# Patient Record
Sex: FEMALE | Race: AMERICAN INDIAN OR ALASKA NATIVE | NOT HISPANIC OR LATINO | Employment: OTHER | URBAN - METROPOLITAN AREA
[De-identification: names, ages, dates, MRNs, and addresses within clinical notes are randomized per-mention and may not be internally consistent; named-entity substitution may affect disease eponyms.]

---

## 2021-10-25 ENCOUNTER — HOSPITAL ENCOUNTER (EMERGENCY)
Facility: MEDICAL CENTER | Age: 31
End: 2021-10-25
Attending: EMERGENCY MEDICINE
Payer: COMMERCIAL

## 2021-10-25 VITALS
WEIGHT: 278 LBS | OXYGEN SATURATION: 95 % | HEART RATE: 101 BPM | HEIGHT: 68 IN | RESPIRATION RATE: 20 BRPM | BODY MASS INDEX: 42.13 KG/M2 | DIASTOLIC BLOOD PRESSURE: 68 MMHG | TEMPERATURE: 97.5 F | SYSTOLIC BLOOD PRESSURE: 121 MMHG

## 2021-10-25 DIAGNOSIS — F15.10 METHAMPHETAMINE ABUSE (HCC): ICD-10-CM

## 2021-10-25 DIAGNOSIS — F23 ACUTE PSYCHOSIS (HCC): ICD-10-CM

## 2021-10-25 LAB
ALBUMIN SERPL BCP-MCNC: 4.5 G/DL (ref 3.2–4.9)
ALBUMIN/GLOB SERPL: 1.2 G/DL
ALP SERPL-CCNC: 122 U/L (ref 30–99)
ALT SERPL-CCNC: 36 U/L (ref 2–50)
AMPHET UR QL SCN: POSITIVE
ANION GAP SERPL CALC-SCNC: 20 MMOL/L (ref 7–16)
ANISOCYTOSIS BLD QL SMEAR: ABNORMAL
AST SERPL-CCNC: 35 U/L (ref 12–45)
BARBITURATES UR QL SCN: NEGATIVE
BASOPHILS # BLD AUTO: 0.9 % (ref 0–1.8)
BASOPHILS # BLD: 0.13 K/UL (ref 0–0.12)
BENZODIAZ UR QL SCN: NEGATIVE
BILIRUB SERPL-MCNC: 0.2 MG/DL (ref 0.1–1.5)
BUN SERPL-MCNC: 7 MG/DL (ref 8–22)
BZE UR QL SCN: NEGATIVE
CALCIUM SERPL-MCNC: 9.6 MG/DL (ref 8.5–10.5)
CANNABINOIDS UR QL SCN: POSITIVE
CHLORIDE SERPL-SCNC: 96 MMOL/L (ref 96–112)
CO2 SERPL-SCNC: 19 MMOL/L (ref 20–33)
COMMENT 1642: NORMAL
CREAT SERPL-MCNC: 0.75 MG/DL (ref 0.5–1.4)
EOSINOPHIL # BLD AUTO: 0.07 K/UL (ref 0–0.51)
EOSINOPHIL NFR BLD: 0.5 % (ref 0–6.9)
ERYTHROCYTE [DISTWIDTH] IN BLOOD BY AUTOMATED COUNT: 42.3 FL (ref 35.9–50)
ETHANOL BLD-MCNC: <10.1 MG/DL (ref 0–10)
GLOBULIN SER CALC-MCNC: 3.9 G/DL (ref 1.9–3.5)
GLUCOSE SERPL-MCNC: 102 MG/DL (ref 65–99)
HCG SERPL QL: NEGATIVE
HCT VFR BLD AUTO: 40.5 % (ref 37–47)
HGB BLD-MCNC: 13.2 G/DL (ref 12–16)
IMM GRANULOCYTES # BLD AUTO: 0.52 K/UL (ref 0–0.11)
IMM GRANULOCYTES NFR BLD AUTO: 3.7 % (ref 0–0.9)
LYMPHOCYTES # BLD AUTO: 2.47 K/UL (ref 1–4.8)
LYMPHOCYTES NFR BLD: 17.4 % (ref 22–41)
MACROCYTES BLD QL SMEAR: ABNORMAL
MCH RBC QN AUTO: 30.3 PG (ref 27–33)
MCHC RBC AUTO-ENTMCNC: 32.6 G/DL (ref 33.6–35)
MCV RBC AUTO: 92.9 FL (ref 81.4–97.8)
METHADONE UR QL SCN: NEGATIVE
MICROCYTES BLD QL SMEAR: ABNORMAL
MONOCYTES # BLD AUTO: 1.18 K/UL (ref 0–0.85)
MONOCYTES NFR BLD AUTO: 8.3 % (ref 0–13.4)
MORPHOLOGY BLD-IMP: NORMAL
NEUTROPHILS # BLD AUTO: 9.86 K/UL (ref 2–7.15)
NEUTROPHILS NFR BLD: 69.2 % (ref 44–72)
NRBC # BLD AUTO: 0.07 K/UL
NRBC BLD-RTO: 0.5 /100 WBC
OPIATES UR QL SCN: NEGATIVE
OXYCODONE UR QL SCN: NEGATIVE
PCP UR QL SCN: NEGATIVE
PLATELET # BLD AUTO: 317 K/UL (ref 164–446)
PLATELET BLD QL SMEAR: NORMAL
PMV BLD AUTO: 10 FL (ref 9–12.9)
POIKILOCYTOSIS BLD QL SMEAR: NORMAL
POLYCHROMASIA BLD QL SMEAR: NORMAL
POTASSIUM SERPL-SCNC: 4.1 MMOL/L (ref 3.6–5.5)
PROPOXYPH UR QL SCN: NEGATIVE
PROT SERPL-MCNC: 8.4 G/DL (ref 6–8.2)
RBC # BLD AUTO: 4.36 M/UL (ref 4.2–5.4)
RBC BLD AUTO: PRESENT
SODIUM SERPL-SCNC: 135 MMOL/L (ref 135–145)
WBC # BLD AUTO: 14.2 K/UL (ref 4.8–10.8)

## 2021-10-25 PROCEDURE — 96376 TX/PRO/DX INJ SAME DRUG ADON: CPT

## 2021-10-25 PROCEDURE — 700111 HCHG RX REV CODE 636 W/ 250 OVERRIDE (IP): Performed by: EMERGENCY MEDICINE

## 2021-10-25 PROCEDURE — 99285 EMERGENCY DEPT VISIT HI MDM: CPT

## 2021-10-25 PROCEDURE — A9270 NON-COVERED ITEM OR SERVICE: HCPCS | Performed by: EMERGENCY MEDICINE

## 2021-10-25 PROCEDURE — 80307 DRUG TEST PRSMV CHEM ANLYZR: CPT

## 2021-10-25 PROCEDURE — 82077 ASSAY SPEC XCP UR&BREATH IA: CPT

## 2021-10-25 PROCEDURE — 80053 COMPREHEN METABOLIC PANEL: CPT

## 2021-10-25 PROCEDURE — 700102 HCHG RX REV CODE 250 W/ 637 OVERRIDE(OP): Performed by: EMERGENCY MEDICINE

## 2021-10-25 PROCEDURE — 85025 COMPLETE CBC W/AUTO DIFF WBC: CPT

## 2021-10-25 PROCEDURE — 84703 CHORIONIC GONADOTROPIN ASSAY: CPT

## 2021-10-25 PROCEDURE — 96374 THER/PROPH/DIAG INJ IV PUSH: CPT

## 2021-10-25 RX ORDER — LORAZEPAM 2 MG/ML
2 INJECTION INTRAMUSCULAR ONCE
Status: COMPLETED | OUTPATIENT
Start: 2021-10-25 | End: 2021-10-25

## 2021-10-25 RX ORDER — LORAZEPAM 2 MG/ML
1 INJECTION INTRAMUSCULAR ONCE
Status: COMPLETED | OUTPATIENT
Start: 2021-10-25 | End: 2021-10-25

## 2021-10-25 RX ORDER — LORAZEPAM 2 MG/ML
2 INJECTION INTRAMUSCULAR ONCE
Status: DISCONTINUED | OUTPATIENT
Start: 2021-10-25 | End: 2021-10-25

## 2021-10-25 RX ADMIN — LORAZEPAM 1 MG: 2 INJECTION INTRAMUSCULAR; INTRAVENOUS at 01:58

## 2021-10-25 RX ADMIN — NICOTINE POLACRILEX 2 MG: 2 GUM, CHEWING BUCCAL at 12:03

## 2021-10-25 RX ADMIN — LORAZEPAM 2 MG: 2 INJECTION INTRAMUSCULAR; INTRAVENOUS at 11:14

## 2021-10-25 RX ADMIN — NICOTINE POLACRILEX 2 MG: 2 GUM, CHEWING BUCCAL at 05:21

## 2021-10-25 NOTE — DISCHARGE PLANNING
Alert Team  Consult order noted.  Pt added to list of pending consults; she is #5, so will likely be a few hours.

## 2021-10-25 NOTE — ED NOTES
Pt increasingly agitated, has  Come out of room when saw [employer] residents in [place] discussing another pt. Pt demanding home medications but this can not be verified through family or pharmacy. ERP orders received.

## 2021-10-25 NOTE — ED NOTES
Med Rec unable to be obtained: Pt answers interview questions inappropriately, unable to specify if she is currently using medications.     Attempted call to demographics, with no answer. No home pharmacy listed.

## 2021-10-25 NOTE — ED TRIAGE NOTES
"Chief Complaint   Patient presents with   • Psych Eval     Pt brought in by PD for safety concern. PD was called by gas station staff for pt sitting out in the rain. Upon arrival pt is speaking rapidly with tangential speech. Denies SI/HI. No medical complaints at this time.      .BP (!) 164/79   Pulse (!) 137   Temp 36.2 °C (97.1 °F) (Temporal)   Resp 16   Ht 1.727 m (5' 8\")   Wt (!) 126 kg (278 lb)   SpO2 92%   BMI 42.27 kg/m²     "

## 2021-10-25 NOTE — ED NOTES
Pt unable to participate in interview,   Called pt's family, no answer.  Pt thought she filled RX's at [facility] but PharmD was unable to find profile for pt.

## 2021-10-25 NOTE — ED NOTES
Requesting to leave and be discharged. Pt also wants to go outside to smoke. Updated to poc. Pt has nicotine gum. Pt sitting up at eob.

## 2021-10-25 NOTE — ED NOTES
frequently talking with people passing room. Pt thinking they are talking about her. Pt reassured and redirected to room

## 2021-10-25 NOTE — ED NOTES
"Pt came out to nurses station insisting she needs to go outside and smoke a cigarette and then leave. Pt educated on no smoking policy and re-educated that she is on a legal hod at this time. Pt visibly upset and arguing with RN insisting that she is not on a legal hold stating  And making paranoid statements, saying \"No I'm an adult, this is kidnapping, you guys are going to send me to [facility] and they're gonna murder me there!\" RN offered pt nicotine gum as alternative, pt accepted. When RN left the room to retrieve gum for pt and returned a few mutes later, pt's room smelled heavily of cigarette smoke. RN asked pt if she had smoked a cigarette pt stated Yeah you weren't gonna let me.\"  Pt told she may not smoke in the room and if she did so again her cigarettes would be taken away. Pt verbalized understanding, given nicotine now laying in bed, NADN.   "

## 2021-10-25 NOTE — ED PROVIDER NOTES
"ED Provider Note    CHIEF COMPLAINT  Chief Complaint   Patient presents with   • Psych Eval     Pt brought in by PD for safety concern. PD was called by gas station staff for pt sitting out in the rain. Upon arrival pt is speaking rapidly with tangential speech. Denies SI/HI. No medical complaints at this time.        HPI  Sandra Sigala is a 31 y.o. female who presents to the emergency department with altered mental status. Past medical history unknown given patient clinical condition. Law-enforcement call to patient which was sitting in the rain at local gas station. Patient listed methamphetamine use.    REVIEW OF SYSTEMS  See HPI for further details. All other systems are negative.     PAST MEDICAL HISTORY       SOCIAL HISTORY  Social History     Tobacco Use   • Smoking status: Not on file   Substance and Sexual Activity   • Alcohol use: Not on file   • Drug use: Not on file   • Sexual activity: Not on file       SURGICAL HISTORY  patient denies any surgical history    CURRENT MEDICATIONS  Home Medications    **Home medications have not yet been reviewed for this encounter**         ALLERGIES  Allergies   Allergen Reactions   • Dilaudid [Hydromorphone] Swelling       PHYSICAL EXAM  VITAL SIGNS: BP (!) 164/79   Pulse (!) 137   Temp 36.2 °C (97.1 °F) (Temporal)   Resp 16   Ht 1.727 m (5' 8\")   Wt (!) 126 kg (278 lb)   SpO2 92%   BMI 42.27 kg/m²  @DAPHNIE[[id number]::@  Pulse ox interpretation: I interpret this pulse ox as normal.  Constitutional: Alert in no apparent distress.  HENT: Normocephalic, Atraumatic, Bilateral external ears normal. Nose normal.   Eyes: Pupils are equal and reactive.   Heart: Regular rate and rythm, no murmurs.    Lungs: Clear to auscultation bilaterally.  Skin: Warm, Dry, No erythema, No rash.   Neurologic: Alert, Grossly non-focal.   Psychiatric:  pressured speech, tangential, no SI HI    Results for orders placed or performed during the hospital encounter of 10/25/21   CBC WITH " DIFFERENTIAL   Result Value Ref Range    WBC 14.2 (H) 4.8 - 10.8 K/uL    RBC 4.36 4.20 - 5.40 M/uL    Hemoglobin 13.2 12.0 - 16.0 g/dL    Hematocrit 40.5 37.0 - 47.0 %    MCV 92.9 81.4 - 97.8 fL    MCH 30.3 27.0 - 33.0 pg    MCHC 32.6 (L) 33.6 - 35.0 g/dL    RDW 42.3 35.9 - 50.0 fL    Platelet Count 317 164 - 446 K/uL    MPV 10.0 9.0 - 12.9 fL    Nucleated RBC 0.50 /100 WBC    NRBC (Absolute) 0.07 K/uL   HCG Qual Serum   Result Value Ref Range    Beta-Hcg Qualitative Serum Negative Negative   COMP METABOLIC PANEL   Result Value Ref Range    Sodium 135 135 - 145 mmol/L    Potassium 4.1 3.6 - 5.5 mmol/L    Chloride 96 96 - 112 mmol/L    Co2 19 (L) 20 - 33 mmol/L    Anion Gap 20.0 (H) 7.0 - 16.0    Glucose 102 (H) 65 - 99 mg/dL    Bun 7 (L) 8 - 22 mg/dL    Creatinine 0.75 0.50 - 1.40 mg/dL    Calcium 9.6 8.5 - 10.5 mg/dL    AST(SGOT) 35 12 - 45 U/L    ALT(SGPT) 36 2 - 50 U/L    Alkaline Phosphatase 122 (H) 30 - 99 U/L    Total Bilirubin 0.2 0.1 - 1.5 mg/dL    Albumin 4.5 3.2 - 4.9 g/dL    Total Protein 8.4 (H) 6.0 - 8.2 g/dL    Globulin 3.9 (H) 1.9 - 3.5 g/dL    A-G Ratio 1.2 g/dL   DIAGNOSTIC ALCOHOL   Result Value Ref Range    Diagnostic Alcohol <10.1 0.0 - 10.0 mg/dL   URINE DRUG SCREEN (TRIAGE)   Result Value Ref Range    Amphetamines Urine Positive (A) Negative    Barbiturates Negative Negative    Benzodiazepines Negative Negative    Cocaine Metabolite Negative Negative    Methadone Negative Negative    Opiates Negative Negative    Oxycodone Negative Negative    Phencyclidine -Pcp Negative Negative    Propoxyphene Negative Negative    Cannabinoid Metab Positive (A) Negative   ESTIMATED GFR   Result Value Ref Range    GFR If African American >60 >60 mL/min/1.73 m 2    GFR If Non African American >60 >60 mL/min/1.73 m 2       Emergency department observation:  patient requiring ongoing ER observation for further detoxification from polysubstance abuse and acute psychosis. Will need additional psychiatric evaluation  as well is not currently available until daytime.    start time 0300  family history: patient unable to provide secondary to clinical condition    COURSE & MEDICAL DECISION MAKING  Pertinent Labs & Imaging studies reviewed. (See chart for details)  31-year-old female presented to the emergency department with acute altered middle status and psychosis. Likely driven by methamphetamine as she does admit to. Denies any suicidal or homicidal ideations. Currently unable to care for self and has been placed on legal hold by law enforcement. This has been upheld by myself but patient can be reevaluated in the morning once more sober and once behavioral health services are available for further evaluation.        FINAL IMPRESSION  1. Acute psychosis (HCC)    2. Methamphetamine abuse (HCC)               Electronically signed by: Sandeep Gloria M.D., 10/25/2021 2:50 AM

## 2021-10-25 NOTE — ED NOTES
"PIV placed. Pt tolerated placement well. Blood drawn, labelled with appropriate pt identifiers and sent to lab.   Pt medicated per MAR.  Pt making delusional statmets to RN, \"I have 28 kids, 1 one and 27 girls all from my first pregnancy. That was in my first life, I don't have any kids in this life.\" Pt sitting in bed smiling and watching tv, FORREST LOPEZ.   "

## 2021-10-25 NOTE — PROGRESS NOTES
"ED Provider Progress Note    ED Observation Progress Note    Date of Service: 10/25/21    Interval History  Patient is on a legal hold (page 1) today, medically cleared, but thought to have altered mental status, acute psychosis secondary to methamphetamine use.  Awaiting alert team evaluation, hopeful washout before final disposition.  Please refer to initial notes for complete details.  Patient required Ativan earlier this morning, but is resting comfortably now.  We will try to confirm home medications, however patient states that methamphetamine is prescribed by her doctor for her \"brain cancer and bone cancer.\"    0515 -patient requesting Nicorette gum, states she is allergic to the patch.    1000 -patient reevaluated at bedside.  Still disoriented, tangential but redirectable and cooperative.  Awaiting alert team assessment.    1105 -patient somewhat more agitated, difficulty redirecting although she remains in her exam room.  Will repeat Ativan.    Physical Exam  /77   Pulse (!) 104   Temp 36.3 °C (97.4 °F)   Resp 18   Ht 1.727 m (5' 8\")   Wt (!) 126 kg (278 lb)   SpO2 96%   BMI 42.27 kg/m² .    Constitutional: Awake and alert. Nontoxic  HENT:  Grossly normal  Eyes: Grossly normal  Neck: Normal range of motion  Cardiovascular: Normal heart rate   Thorax & Lungs: No respiratory distress  Abdomen: Nontender  Skin:  No pathologic rash.   Extremities: Well perfused  Psychiatric: Affect normal    Labs  Results for orders placed or performed during the hospital encounter of 10/25/21   CBC WITH DIFFERENTIAL   Result Value Ref Range    WBC 14.2 (H) 4.8 - 10.8 K/uL    RBC 4.36 4.20 - 5.40 M/uL    Hemoglobin 13.2 12.0 - 16.0 g/dL    Hematocrit 40.5 37.0 - 47.0 %    MCV 92.9 81.4 - 97.8 fL    MCH 30.3 27.0 - 33.0 pg    MCHC 32.6 (L) 33.6 - 35.0 g/dL    RDW 42.3 35.9 - 50.0 fL    Platelet Count 317 164 - 446 K/uL    MPV 10.0 9.0 - 12.9 fL    Neutrophils-Polys 69.20 44.00 - 72.00 %    Lymphocytes 17.40 (L) " 22.00 - 41.00 %    Monocytes 8.30 0.00 - 13.40 %    Eosinophils 0.50 0.00 - 6.90 %    Basophils 0.90 0.00 - 1.80 %    Immature Granulocytes 3.70 (H) 0.00 - 0.90 %    Nucleated RBC 0.50 /100 WBC    Neutrophils (Absolute) 9.86 (H) 2.00 - 7.15 K/uL    Lymphs (Absolute) 2.47 1.00 - 4.80 K/uL    Monos (Absolute) 1.18 (H) 0.00 - 0.85 K/uL    Eos (Absolute) 0.07 0.00 - 0.51 K/uL    Baso (Absolute) 0.13 (H) 0.00 - 0.12 K/uL    Immature Granulocytes (abs) 0.52 (H) 0.00 - 0.11 K/uL    NRBC (Absolute) 0.07 K/uL    Anisocytosis 1+     Macrocytosis 1+     Microcytosis 1+    HCG Qual Serum   Result Value Ref Range    Beta-Hcg Qualitative Serum Negative Negative   COMP METABOLIC PANEL   Result Value Ref Range    Sodium 135 135 - 145 mmol/L    Potassium 4.1 3.6 - 5.5 mmol/L    Chloride 96 96 - 112 mmol/L    Co2 19 (L) 20 - 33 mmol/L    Anion Gap 20.0 (H) 7.0 - 16.0    Glucose 102 (H) 65 - 99 mg/dL    Bun 7 (L) 8 - 22 mg/dL    Creatinine 0.75 0.50 - 1.40 mg/dL    Calcium 9.6 8.5 - 10.5 mg/dL    AST(SGOT) 35 12 - 45 U/L    ALT(SGPT) 36 2 - 50 U/L    Alkaline Phosphatase 122 (H) 30 - 99 U/L    Total Bilirubin 0.2 0.1 - 1.5 mg/dL    Albumin 4.5 3.2 - 4.9 g/dL    Total Protein 8.4 (H) 6.0 - 8.2 g/dL    Globulin 3.9 (H) 1.9 - 3.5 g/dL    A-G Ratio 1.2 g/dL   DIAGNOSTIC ALCOHOL   Result Value Ref Range    Diagnostic Alcohol <10.1 0.0 - 10.0 mg/dL   URINE DRUG SCREEN (TRIAGE)   Result Value Ref Range    Amphetamines Urine Positive (A) Negative    Barbiturates Negative Negative    Benzodiazepines Negative Negative    Cocaine Metabolite Negative Negative    Methadone Negative Negative    Opiates Negative Negative    Oxycodone Negative Negative    Phencyclidine -Pcp Negative Negative    Propoxyphene Negative Negative    Cannabinoid Metab Positive (A) Negative   ESTIMATED GFR   Result Value Ref Range    GFR If African American >60 >60 mL/min/1.73 m 2    GFR If Non African American >60 >60 mL/min/1.73 m 2   PERIPHERAL SMEAR REVIEW   Result  Value Ref Range    Peripheral Smear Review see below    PLATELET ESTIMATE   Result Value Ref Range    Plt Estimation Normal    MORPHOLOGY   Result Value Ref Range    RBC Morphology Present     Polychromia 1+     Poikilocytosis 1+    DIFFERENTIAL COMMENT   Result Value Ref Range    Comments-Diff see below        Radiology  No orders to display       Problem List  1.  Acute psychosis: Awaiting alert team assessment, will likely require further psychiatric intervention before final disposition can be made.      Electronically signed by: Gracia Ronquillo D.O., 10/25/2021 11:06 AM

## 2021-10-25 NOTE — ED NOTES
Pt ambulated to bathroom steady gait. Pt has all belongings and clothing in place. Pt is currently on a legal hold. Updated to poc and need for belongings to be placed in bag and secure area. Pt agrees. Changed to gown and bags x 2 placed in locker.

## 2021-10-25 NOTE — ED NOTES
explained poc and unable to verify home medication. Pt medicated as ordered. Pt reports she was recently inpatient at [facility] in [facility].

## 2021-10-26 ENCOUNTER — HOSPITAL ENCOUNTER (EMERGENCY)
Facility: MEDICAL CENTER | Age: 31
End: 2021-10-26
Attending: EMERGENCY MEDICINE | Admitting: EMERGENCY MEDICINE
Payer: COMMERCIAL

## 2021-10-26 VITALS
WEIGHT: 278 LBS | TEMPERATURE: 98.7 F | HEART RATE: 105 BPM | RESPIRATION RATE: 20 BRPM | OXYGEN SATURATION: 93 % | SYSTOLIC BLOOD PRESSURE: 116 MMHG | DIASTOLIC BLOOD PRESSURE: 78 MMHG | HEIGHT: 68 IN | BODY MASS INDEX: 42.13 KG/M2

## 2021-10-26 DIAGNOSIS — F15.10 METHAMPHETAMINE ABUSE (HCC): ICD-10-CM

## 2021-10-26 PROCEDURE — 99283 EMERGENCY DEPT VISIT LOW MDM: CPT

## 2021-10-26 ASSESSMENT — FIBROSIS 4 INDEX: FIB4 SCORE: 0.57

## 2021-10-26 NOTE — ED NOTES
Late entry 1446  Behavioral health RN at bedside.     1500 Pt remains on close observation. Ambulatory to bathroom     1717 clothing returned to pt. Pt  Discharged home to self. Understands to follow up as scheduled later this week. Pt remained under q 15 observation during epic downtime.

## 2021-10-26 NOTE — ED PROVIDER NOTES
"ED Provider Note    CHIEF COMPLAINT  Chief Complaint   Patient presents with   • Drug Abuse   • Psych Eval       HPI  Sandra Sigala is a 31 y.o. female who presents back to the emergency department stating that she was tresp[place]. States that she does not have money. Has no place to go.    Chart review: I personally saw the patient last night. At that point she was clearly intoxicated on methamphetamine. Was seen by psychiatry and discharged from the hospital and taken off legal hold as was originally placed by law enforcement last night.    Currently no SI HI    REVIEW OF SYSTEMS  See HPI for further details. All other systems are negative.     PAST MEDICAL HISTORY       SOCIAL HISTORY  Social History     Tobacco Use   • Smoking status: Never Smoker   • Smokeless tobacco: Never Used   Vaping Use   • Vaping Use: Never used   Substance and Sexual Activity   • Alcohol use: Yes   • Drug use: Yes     Comment: meth   • Sexual activity: Not on file       SURGICAL HISTORY  patient denies any surgical history    CURRENT MEDICATIONS  Home Medications    **Home medications have not yet been reviewed for this encounter**         ALLERGIES  Allergies   Allergen Reactions   • Dilaudid [Hydromorphone] Swelling       PHYSICAL EXAM  VITAL SIGNS: /81   Pulse (!) 112   Temp 36.2 °C (97.1 °F) (Temporal)   Resp 20   Ht 1.727 m (5' 8\")   Wt (!) 126 kg (278 lb)   LMP  (LMP Unknown)   SpO2 91%   BMI 42.27 kg/m²  @DAPHNIE[[id number]::@  Pulse ox interpretation: I interpret this pulse ox as normal.  Constitutional: Alert in no apparent distress.  HENT: Normocephalic, Atraumatic, Bilateral external ears normal. Nose normal.   Eyes: Pupils are equal and reactive.  Heart: Regular rate and rythm, no murmurs.    Lungs: Clear to auscultation bilaterally.  Skin: Warm, Dry, No erythema, No rash.   Neurologic: Alert, Grossly non-focal.   Psychiatric: slightly pressured speech with slight agitation, no SI " HI          COURSE & MEDICAL DECISION MAKING  Pertinent Labs & Imaging studies reviewed. (See chart for details)  31-year-old female presented to the emergency department with primary complaint of no place ago with no money. Denies any suicidal homicidal ideations. Less intoxicated tonight. No threat to self or others. Able to take care of self per my evaluation. I do not believe that she meets legal hold criteria. You not believe that she needs further acute ER evaluation from a behavioral health standpoint. This point the patient will be transfer to local [facility] for ongoing outpatient polysubstance abuse counseling as the patient agrees to currently.      . The patient will return for worsening symptoms and is stable at the time of discharge. The patient verbalizes understanding and will comply.    FINAL IMPRESSION  1. Methamphetamine abuse (HCC)               Electronically signed by: Sandeep Gloria M.D., 10/26/2021 3:49 AM

## 2021-10-26 NOTE — ED NOTES
Patient discharged in stable condition per orders. Patient verbalized understanding of all discharge instructions. All belongings accounted for. Ambulatory to lobby with steady gait. Cab called for pt, pt to go to [facility].

## 2021-10-26 NOTE — DISCHARGE PLANNING
Alert Team:    ERP spoke with patient at bedside who is amendable to connect with [facility] for crisis stabilization to address substance detox and PMH treatment. Cab voucher # [id number] provided at discharge. Secure e-mail ER interventions to facility.

## 2021-10-26 NOTE — ED TRIAGE NOTES
Sandra Sigala  31 y.o.  Chief Complaint   Patient presents with   • Drug Abuse   • Psych Eval       BIB EMS for above complaint. Ambulatory to room from ambulance, pt reports unable to ambulate due to paralysis. Pt speaks in tangents with unformed thoughts, unable to follow commands, A&Ox4. Per UDS yesterday, pt positive for meth, denies use at this time. FSBS 94.

## 2021-10-26 NOTE — DISCHARGE SUMMARY
"  ED Observation Discharge Summary    Patient:Sandra Sigala  Patient : 1990  Patient MRN: [id number]  Patient PCP: No primary care provider on file.    Admit Date: 10/25/2021  Discharge Date and Time: 10/25/21 5:42 PM  Discharge Diagnosis:   1. Acute psychosis (HCC)    2. Methamphetamine abuse (HCC)      Discharge Attending: Celestino Chisholm M.D.  Discharge Service: ED Observation    ED Course  Sandra is a 31 y.o. female who was evaluated at [facility] for acute psychosis secondary to methamphetamine abuse.  She was held in the emergency department on a legal  hold with an inability to reliably care for herself.  During her time here her mentation improved significantly, she was evaluated by the psychiatric evaluator and is now clearly demonstrating the ability to care for herself that she seems to have cleared significantly from her methamphetamine induced acute psychosis.  The patient is being released from her legal hold and being discharged home in stable condition with return instructions provided.  She tells me she feels much better and is ready to go home.      Discharge Exam:  /77   Pulse (!) 104   Temp 36.3 °C (97.4 °F)   Resp 18   Ht 1.727 m (5' 8\")   Wt (!) 126 kg (278 lb)   SpO2 96%   BMI 42.27 kg/m² .    Constitutional: Awake and alert. Nontoxic  HENT:  Grossly normal  Thorax & Lungs: No respiratory distress  Skin: Exposed portions of the skin are unremarkable      Labs  Results for orders placed or performed during the hospital encounter of 10/25/21   CBC WITH DIFFERENTIAL   Result Value Ref Range    WBC 14.2 (H) 4.8 - 10.8 K/uL    RBC 4.36 4.20 - 5.40 M/uL    Hemoglobin 13.2 12.0 - 16.0 g/dL    Hematocrit 40.5 37.0 - 47.0 %    MCV 92.9 81.4 - 97.8 fL    MCH 30.3 27.0 - 33.0 pg    MCHC 32.6 (L) 33.6 - 35.0 g/dL    RDW 42.3 35.9 - 50.0 fL    Platelet Count 317 164 - 446 K/uL    MPV 10.0 9.0 - 12.9 fL    Neutrophils-Polys 69.20 44.00 - 72.00 %    Lymphocytes " 17.40 (L) 22.00 - 41.00 %    Monocytes 8.30 0.00 - 13.40 %    Eosinophils 0.50 0.00 - 6.90 %    Basophils 0.90 0.00 - 1.80 %    Immature Granulocytes 3.70 (H) 0.00 - 0.90 %    Nucleated RBC 0.50 /100 WBC    Neutrophils (Absolute) 9.86 (H) 2.00 - 7.15 K/uL    Lymphs (Absolute) 2.47 1.00 - 4.80 K/uL    Monos (Absolute) 1.18 (H) 0.00 - 0.85 K/uL    Eos (Absolute) 0.07 0.00 - 0.51 K/uL    Baso (Absolute) 0.13 (H) 0.00 - 0.12 K/uL    Immature Granulocytes (abs) 0.52 (H) 0.00 - 0.11 K/uL    NRBC (Absolute) 0.07 K/uL    Anisocytosis 1+     Macrocytosis 1+     Microcytosis 1+    HCG Qual Serum   Result Value Ref Range    Beta-Hcg Qualitative Serum Negative Negative   COMP METABOLIC PANEL   Result Value Ref Range    Sodium 135 135 - 145 mmol/L    Potassium 4.1 3.6 - 5.5 mmol/L    Chloride 96 96 - 112 mmol/L    Co2 19 (L) 20 - 33 mmol/L    Anion Gap 20.0 (H) 7.0 - 16.0    Glucose 102 (H) 65 - 99 mg/dL    Bun 7 (L) 8 - 22 mg/dL    Creatinine 0.75 0.50 - 1.40 mg/dL    Calcium 9.6 8.5 - 10.5 mg/dL    AST(SGOT) 35 12 - 45 U/L    ALT(SGPT) 36 2 - 50 U/L    Alkaline Phosphatase 122 (H) 30 - 99 U/L    Total Bilirubin 0.2 0.1 - 1.5 mg/dL    Albumin 4.5 3.2 - 4.9 g/dL    Total Protein 8.4 (H) 6.0 - 8.2 g/dL    Globulin 3.9 (H) 1.9 - 3.5 g/dL    A-G Ratio 1.2 g/dL   DIAGNOSTIC ALCOHOL   Result Value Ref Range    Diagnostic Alcohol <10.1 0.0 - 10.0 mg/dL   URINE DRUG SCREEN (TRIAGE)   Result Value Ref Range    Amphetamines Urine Positive (A) Negative    Barbiturates Negative Negative    Benzodiazepines Negative Negative    Cocaine Metabolite Negative Negative    Methadone Negative Negative    Opiates Negative Negative    Oxycodone Negative Negative    Phencyclidine -Pcp Negative Negative    Propoxyphene Negative Negative    Cannabinoid Metab Positive (A) Negative   ESTIMATED GFR   Result Value Ref Range    GFR If African American >60 >60 mL/min/1.73 m 2    GFR If Non African American >60 >60 mL/min/1.73 m 2   PERIPHERAL SMEAR REVIEW    Result Value Ref Range    Peripheral Smear Review see below    PLATELET ESTIMATE   Result Value Ref Range    Plt Estimation Normal    MORPHOLOGY   Result Value Ref Range    RBC Morphology Present     Polychromia 1+     Poikilocytosis 1+    DIFFERENTIAL COMMENT   Result Value Ref Range    Comments-Diff see below        Disposition: Discharged home in stable condition    Final Diagnosis  1. Acute psychosis (HCC)    2. Methamphetamine abuse (HCC)        Electronically signed by: Celestino Chisholm M.D., 10/25/2021 5:42 PM

## 2021-10-27 PROCEDURE — 99283 EMERGENCY DEPT VISIT LOW MDM: CPT

## 2021-10-27 ASSESSMENT — FIBROSIS 4 INDEX: FIB4 SCORE: 0.57

## 2021-10-28 ENCOUNTER — HOSPITAL ENCOUNTER (EMERGENCY)
Facility: MEDICAL CENTER | Age: 31
End: 2021-10-28
Attending: EMERGENCY MEDICINE
Payer: COMMERCIAL

## 2021-10-28 VITALS
RESPIRATION RATE: 18 BRPM | BODY MASS INDEX: 42.13 KG/M2 | DIASTOLIC BLOOD PRESSURE: 88 MMHG | HEIGHT: 68 IN | TEMPERATURE: 97.7 F | HEART RATE: 106 BPM | WEIGHT: 278 LBS | OXYGEN SATURATION: 96 % | SYSTOLIC BLOOD PRESSURE: 166 MMHG

## 2021-10-28 DIAGNOSIS — F15.90 AMPHETAMINE USER: ICD-10-CM

## 2021-10-28 DIAGNOSIS — Z59.00 HOMELESS: ICD-10-CM

## 2021-10-28 SDOH — ECONOMIC STABILITY - HOUSING INSECURITY: HOMELESSNESS UNSPECIFIED: Z59.00

## 2021-10-28 NOTE — ED TRIAGE NOTES
Chief Complaint   Patient presents with   • Psych Eval     off medications, denies SI/HI, admits to drinking alcohol       Patient BIB EMS after trespassing and attempting to steal alcohol. Patient was seen here yesterday for meth use and a psych eval and discharged to the [facility].     Pt is alert and oriented, speaking in full sentences, follows commands and responds appropriately to questions. Resp are even and unlabored.      Pt placed in lobby. Pt educated on triage process. Pt encouraged to alert staff for any changes.     Patient and staff wearing appropriate PPE    Vitals:    10/27/21 2349   BP: 151/99   Pulse: (!) 118   Resp: 18   Temp: 36 °C (96.8 °F)   SpO2: 95%     s

## 2021-10-28 NOTE — ED NOTES
Pt aggressive at discharge- refusing to leave. Security stand by. Pt stole pillow and all linens. Gave patient socks and bus pas and shelter [facility].

## 2021-10-28 NOTE — ED PROVIDER NOTES
"ED Provider Note  CHIEF COMPLAINT  Chief Complaint   Patient presents with   • Psych Eval     off medications, denies SI/HI, admits to drinking alcohol       HPI  Sandra Sigala is a 31 y.o. female presents to the emergency department with primary request of a place to stay. No acute changes from baseline. Again denies any suicidal homicidal ideations. I have personally seen this patient the last two days. Again no changes from baseline. No acute medical complaint.    REVIEW OF SYSTEMS  See HPI for further details. All other systems are negative.     PAST MEDICAL HISTORY       SOCIAL HISTORY  Social History     Tobacco Use   • Smoking status: Never Smoker   • Smokeless tobacco: Never Used   Vaping Use   • Vaping Use: Never used   Substance and Sexual Activity   • Alcohol use: Yes   • Drug use: Yes     Comment: meth   • Sexual activity: Not on file       SURGICAL HISTORY  patient denies any surgical history    CURRENT MEDICATIONS  Home Medications     Reviewed by Rafy Richardson R.N. (Registered Nurse) on 10/27/21 at 2352  Med List Status: <None>   Medication Last Dose Status        Patient Chris Taking any Medications                       ALLERGIES  Allergies   Allergen Reactions   • Dilaudid [Hydromorphone] Swelling       PHYSICAL EXAM  VITAL SIGNS: BP (!) 166/88   Pulse (!) 106   Temp 36.5 °C (97.7 °F)   Resp 18   Ht 1.727 m (5' 8\")   Wt (!) 126 kg (278 lb)   LMP  (LMP Unknown)   SpO2 96%   BMI 42.27 kg/m²  @[id number]::@  Pulse ox interpretation: I interpret this pulse ox as normal.  Constitutional: Alert in no apparent distress.  HENT: Normocephalic, Atraumatic, Bilateral external ears normal. Nose normal.   Eyes: Pupils are equal and reactive.    Heart: Regular rate and rythm, no murmurs.    Lungs: Clear to auscultation bilaterally.  Skin: Warm, Dry, No erythema, No rash.   Neurologic: Alert, Grossly non-focal.   Psychiatric: denies SI or HI          COURSE & MEDICAL DECISION MAKING  Pertinent " Labs & Imaging studies reviewed. (See chart for details)  31-year-old female presented back to the emergency department. Tonight primarily requesting a place to stay. Yesterday arrange for her to go to detox facility however after getting a cab to the facility she then never made insight for further intake. At this point given lack of any new complaint or changes. I do not believe that any further emergent workup will be required. Patient has been discharged at this point. I have strongly encouraged her to follow-up local facilities as previously referred in the last 48 hours by myself.   The patient will return for worsening symptoms and is stable at the time of discharge. The patient verbalizes understanding and will comply.    FINAL IMPRESSION  1. Homeless    2. Amphetamine user (HCC)               Electronically signed by: Sandeep Gloria M.D., 10/28/2021 7:12 AM

## 2021-10-28 NOTE — ED NOTES
"Pt agitated on triage- pt states \"can you just take me back so I can go to sleep already?\" RN asked pt to keep mask over mouth pt states \"oh my gosh I heard you! I cant freaking breathe with this thing!\" Refuses to follow directions and falls alseep immediately  "

## 2021-10-29 ENCOUNTER — HOSPITAL ENCOUNTER (EMERGENCY)
Facility: MEDICAL CENTER | Age: 31
End: 2021-10-29
Attending: EMERGENCY MEDICINE
Payer: COMMERCIAL

## 2021-10-29 VITALS
RESPIRATION RATE: 18 BRPM | HEIGHT: 68 IN | HEART RATE: 104 BPM | BODY MASS INDEX: 42.13 KG/M2 | WEIGHT: 278 LBS | SYSTOLIC BLOOD PRESSURE: 135 MMHG | TEMPERATURE: 98 F | DIASTOLIC BLOOD PRESSURE: 84 MMHG | OXYGEN SATURATION: 94 %

## 2021-10-29 DIAGNOSIS — F15.10 METHAMPHETAMINE ABUSE (HCC): ICD-10-CM

## 2021-10-29 DIAGNOSIS — F23 ACUTE PSYCHOSIS (HCC): ICD-10-CM

## 2021-10-29 DIAGNOSIS — F22 DELUSIONS (HCC): ICD-10-CM

## 2021-10-29 LAB
AMPHET UR QL SCN: POSITIVE
BARBITURATES UR QL SCN: NEGATIVE
BENZODIAZ UR QL SCN: NEGATIVE
BZE UR QL SCN: NEGATIVE
CANNABINOIDS UR QL SCN: POSITIVE
METHADONE UR QL SCN: NEGATIVE
OPIATES UR QL SCN: NEGATIVE
OXYCODONE UR QL SCN: NEGATIVE
PCP UR QL SCN: NEGATIVE
POC BREATHALIZER: 0 PERCENT (ref 0–0.01)
PROPOXYPH UR QL SCN: NEGATIVE

## 2021-10-29 PROCEDURE — A9270 NON-COVERED ITEM OR SERVICE: HCPCS | Performed by: EMERGENCY MEDICINE

## 2021-10-29 PROCEDURE — 90791 PSYCH DIAGNOSTIC EVALUATION: CPT

## 2021-10-29 PROCEDURE — 302970 POC BREATHALIZER

## 2021-10-29 PROCEDURE — 99284 EMERGENCY DEPT VISIT MOD MDM: CPT

## 2021-10-29 PROCEDURE — 80307 DRUG TEST PRSMV CHEM ANLYZR: CPT

## 2021-10-29 PROCEDURE — 700102 HCHG RX REV CODE 250 W/ 637 OVERRIDE(OP): Performed by: EMERGENCY MEDICINE

## 2021-10-29 RX ORDER — DIVALPROEX SODIUM 500 MG/1
500 TABLET, DELAYED RELEASE ORAL ONCE
Status: COMPLETED | OUTPATIENT
Start: 2021-10-29 | End: 2021-10-29

## 2021-10-29 RX ORDER — HYDROXYZINE HYDROCHLORIDE 25 MG/1
25 TABLET, FILM COATED ORAL ONCE
Status: COMPLETED | OUTPATIENT
Start: 2021-10-29 | End: 2021-10-29

## 2021-10-29 RX ORDER — HALOPERIDOL 5 MG/1
5 TABLET ORAL ONCE
Status: COMPLETED | OUTPATIENT
Start: 2021-10-29 | End: 2021-10-29

## 2021-10-29 RX ORDER — DIVALPROEX SODIUM 500 MG/1
500 TABLET, DELAYED RELEASE ORAL 2 TIMES DAILY
Status: SHIPPED | COMMUNITY
End: 2023-01-31

## 2021-10-29 RX ADMIN — HYDROXYZINE HYDROCHLORIDE 25 MG: 25 TABLET, FILM COATED ORAL at 09:46

## 2021-10-29 RX ADMIN — DIVALPROEX SODIUM 500 MG: 500 TABLET, DELAYED RELEASE ORAL at 14:06

## 2021-10-29 RX ADMIN — HALOPERIDOL 5 MG: 5 TABLET ORAL at 09:46

## 2021-10-29 ASSESSMENT — FIBROSIS 4 INDEX: FIB4 SCORE: 0.57

## 2021-10-29 NOTE — ED NOTES
"Patient rounded on and is resting comfortably. Denies any additional needs at this time. Pt awaiting behavioral heatlh/alert team evaluation. Pt remains refusing to change into gown and remove blankets from bra, pt indicating that the blankets are placed where they are due to pt's belief they are having a \"boob job\". Pt otherwise is calm and cooperative and no prior events were noted of by off-going RN.  "

## 2021-10-29 NOTE — ED PROVIDER NOTES
"ED Provider Note    ED Provider Note    Primary care provider: No primary care provider on file.  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 female who presents to the Emergency Department with delusions.  Patient has rapid, tangential speech.  She has a admitted and known history of methamphetamine use.  Content of rambling speech, consistent with nurses notes.  History limited.  Methamphetamine use.    REVIEW OF SYSTEMS  Review of Systems   Unable to perform ROS: Psychiatric disorder       PAST MEDICAL HISTORY   Methamphetamine use    SURGICAL HISTORY  unknown    SOCIAL HISTORY  Social History     Tobacco Use   • Smoking status: Never Smoker   • Smokeless tobacco: Never Used   Vaping Use   • Vaping Use: Never used   Substance Use Topics   • Alcohol use: Yes   • Drug use: Yes     Comment: meth      Social History     Substance and Sexual Activity   Drug Use Yes    Comment: meth       FAMILY HISTORY  History reviewed. No pertinent family history.    CURRENT MEDICATIONS  Home Medications     Reviewed by Santiago Millard R.N. (Registered Nurse) on 10/29/21 at 0308  Med List Status: Not Addressed   Medication Last Dose Status        Patient Chris Taking any Medications                       ALLERGIES  Allergies   Allergen Reactions   • Dilaudid [Hydromorphone] Swelling       PHYSICAL EXAM  VITAL SIGNS: /68   Pulse (!) 138   Temp 36.4 °C (97.6 °F) (Temporal)   Resp (!) 22   Ht 1.727 m (5' 8\")   Wt (!) 126 kg (278 lb)   LMP  (LMP Unknown) Comment: unable to asses   SpO2 92%   BMI 42.27 kg/m²   Vitals " reviewed.  Constitutional: Patient is oriented to person.  Obese female.  No distress.    Head: Normocephalic and atraumatic.  Mouth/Throat: Oropharynx is clear and moist.  Eyes: Conjunctivae are normal. Pupils are equal and round.  Neck: Normal range of motion.  Cardiovascular: Normal rate, regular rhythm and normal heart sounds.  Pulmonary/Chest: Effort normal and breath sounds normal. No respiratory distress, no wheezes, rhonchi, or rales.   Abdominal: Soft. Bowel sounds are normal. There is no tenderness.   Musculoskeletal: No edema   Neurological: No focal deficits.   Skin: Skin is warm and dry. No erythema. No pallor.   Psychiatric: Delusional.  Rapid tangential speech.    LABS  Results for orders placed or performed during the hospital encounter of 10/29/21   URINE DRUG SCREEN   Result Value Ref Range    Amphetamines Urine Positive (A) Negative    Barbiturates Negative Negative    Benzodiazepines Negative Negative    Cocaine Metabolite Negative Negative    Methadone Negative Negative    Opiates Negative Negative    Oxycodone Negative Negative    Phencyclidine -Pcp Negative Negative    Propoxyphene Negative Negative    Cannabinoid Metab Positive (A) Negative   POC BREATHALIZER   Result Value Ref Range    POC Breathalizer 0.00 0.00 - 0.01 Percent       All labs reviewed by me.    COURSE & MEDICAL DECISION MAKING  Pertinent Labs & Imaging studies reviewed. (See chart for details)    Obtained and reviewed past medical records.  Patient was not seen in this emergency department until October 25 of this year.  Since that time, she had an extended stay, ED observation.  Was ultimately discharged.  She returned the following day with similar presentation.  She was seen here yesterday and again today.      3:48 AM - Patient seen and examined at bedside. Patient has rapid, tangential speech.  She is delusional.  History of methamphetamine use.  I suspect, that she has an underlying mental health disorder although I  cannot gather that information now.  She is able to be redirected.  Await alert team evaluation as I am concerned, patient's persistent methamphetamine use, has landed her multiple times here in the emergency department.    0900AM D/W Jaylene from the Alert Team, patient is apparently, from [place].  She initially came here with her mother but now she is homeless.  Await sobriety from methamphetamines.  In the meantime, she will be treated with Haldol and hydroxyzine.  Until that time, patient will be placed on ED observation.    Patient placed in ED observation at 11:38 AM, October 29, 2021.    FINAL IMPRESSION  1. Methamphetamine abuse (HCC)    2. Delusions (HCC)

## 2021-10-29 NOTE — ED PROVIDER NOTES
"ED Provider Note    Patient:Sandra Sigala  Patient : 1990  Patient MRN: [id number]  Patient PCP: No primary care provider on file.    Admit Date: 10/29/2021  Discharge Date and Time: 10/29/21 2:31 PM  Discharge Diagnosis:   1. Methamphetamine abuse (HCC)     2. Delusions (HCC)     3. Acute psychosis ([facility])     Discharge Attending: Gordon Marie M.D.  Discharge Service: ED Observation    ED Course  Sandra is a 31 y.o. female who was evaluated at [facility] for delusional behavior.  She has a known history of methamphetamine use.  She arrived with rambling, rapid, and tangential speech.  She was given a dose of Haldol and hydroxyzine and placed on ED observation.  She was ultimately awake enough to discuss her presentation with the alert team personnel who have recommended that she be discharged to [facility].  She was not placed on a legal hold.  She was ultimately discharged to the [facility] and transported by the peer recovery support team.    Discharge Exam:  /84   Pulse (!) 104   Temp 36.7 °C (98 °F) (Tympanic)   Resp 18   Ht 1.727 m (5' 8\")   Wt (!) 126 kg (278 lb)   LMP  (LMP Unknown) Comment: unable to asses   SpO2 94%   BMI 42.27 kg/m² .    Constitutional: Awake and alert. Nontoxic  HENT:  Grossly normal    Labs  Results for orders placed or performed during the hospital encounter of 10/29/21   URINE DRUG SCREEN   Result Value Ref Range    Amphetamines Urine Positive (A) Negative    Barbiturates Negative Negative    Benzodiazepines Negative Negative    Cocaine Metabolite Negative Negative    Methadone Negative Negative    Opiates Negative Negative    Oxycodone Negative Negative    Phencyclidine -Pcp Negative Negative    Propoxyphene Negative Negative    Cannabinoid Metab Positive (A) Negative   POC BREATHALIZER   Result Value Ref Range    POC Breathalizer 0.00 0.00 - 0.01 Percent       Radiology  No orders to display       Disposition: [facility]    Follow up: No follow-ups on file.    Medications: "   New Prescriptions    No medications on file       Discharge Condition: Stable    Electronically signed by: Gordon Marie M.D., 10/29/2021 2:31 PM

## 2021-10-29 NOTE — ED TRIAGE NOTES
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 Pt states she smoked meth today.  Pt is having a hard time time concentrating, pt is delusional, pt states she is having      Pt is not making sense when she is talking about her story.  Pt states she needs more alcohol.      Pt placed in lobby and educated on triage process. Pt encouraged to alert staff for any changes in condition.

## 2021-10-29 NOTE — CONSULTS
"[facility]   TRIAGE ASSESSMENT    Name: Sandra Sigala  MRN: [id number]  : 1990  Age: 31 y.o.  Date of assessment: 10/29/2021  PCP: No primary care provider on file.  Persons in attendance: Patient  Patient Location: [facility]    CHIEF COMPLAINT/PRESENTING ISSUE (as stated by patient): 31 year old female BIB self today with disorganized thoughts, states current methamphetamine use;  UDS + Amphetamines, THC; pt received Haldol 5 mg PO with Vistaril 25 mg PO today at 0946; pt states recently in [place] x 3 weeks from [place], [place], with transport to [place] by her mother; states she is staying with her mother, did not return home last night and came to the ED today; with 3 [facility] ED visits 10/25/21, 10/26/21, and 10/28/21 for methamphetamine use, homelessness, with DC to self and [facility]; today, pt with racing thoughts, pressured speech; alert, oriented to person, place, date, disoriented to some recent events; no SI, HI, or self-harm ideation noted; with some generalized paranoia; delusional statements, focused on needing a \"breast surgery\" and perseverating on she has had surgery and treatment for \"cancer\"; insight, judgment adequate, cooperative; able to state how she can provide basic needs for self and plans to return to her mother's residence; states previous  [place] [facility]; states current psych med includes Depakote 500 mg PO BID, cannot state prescribing prescriber; states current substance use includes Methamphetamines 2-3 times a week smoking with last use 10/28/21, THC occasionally with last use 10/29/21, ETOH 1 beer occasionally with last use 10/28/21; tates h/o arrests for trespassing; unemployed, receives SSD, states \"$2400 a month\"    Pt received Depakote 500 mg ER PO today at 1406      Chief Complaint   Patient presents with   • Psych Eval     pt states 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   Yes:    Dates Level of Care Facilty/Provider Diagnosis/Problem Medications   2021 outpt   [place], with [facility]          SAFETY ASSESSMENT - SELF  Does patient acknowledge current or past symptoms of dangerousness to self or is previous history noted? no  Does parent/significant other report patient has current or past symptoms of dangerousness to self? N\A  Does presenting problem suggest symptoms of dangerousness to self? No    SAFETY ASSESSMENT - OTHERS  Does patient acknowledge current or past symptoms of aggressive behavior or risk to others or is previous history noted? no  Does parent/significant other report patient has current or past symptoms of aggressive behavior or risk to others?  N\A  Does presenting problem suggest symptoms of dangerousness to others? No    LEGAL HISTORY  Does patient acknowledge history of arrest/M[facility]/FPC or is previous history noted? Yes-h/o arrests for trespassing    Crisis Safety Plan completed and copy given to patient? N\A    ABUSE/NEGLECT SCREENING  Does patient report feeling “unsafe” in his/her home, or afraid of anyone?  no  Does patient report any history of physical, sexual, or emotional abuse?  no  Does parent or significant other report any of the above? N\A  Is there evidence of neglect " "by self?  yes  Is there evidence of neglect by a caregiver? no  Does the patient/parent report any history of CPS/APS/police involvement related to suspected abuse/neglect or domestic violence? no  Based on the information provided during the current assessment, is a mandated report of suspected abuse/neglect being made?  No    SUBSTANCE USE SCREENING  Yes:  Sung all substances used in the past 30 days:      Last Use Amount   [x]   Alcohol 10/28/21 occassionally   [x]   Marijuana 10/29/21 occassionally   []   Heroin     []   Prescription Opioids  (used without prescription, for    recreation, or in excess of prescribed amount)     []   Other Prescription  (used without prescription, for    recreation, or in excess of prescribed amount)     []   Cocaine      [x]   Methamphetamine 10/28/21 2-3 x week    []   \"\" drugs (ectasy, MDMA)     []   Other substances        UDS results: + Amphetamines, THC  Breathalyzer results: negative    What consequences does the patient associate with any of the above substance use and or addictive behaviors? None    Risk factors for detox (check all that apply):  []  Seizures   []  Diaphoretic (sweating)   []  Tremors   []  Hallucinations   []  Increased blood pressure   []  Decreased blood pressure   []  Other   []  None      [] Patient education on risk factors for detoxification and instructed to return to ER as needed.      MENTAL STATUS   Participation: Active verbal participation and Open to feedback  Grooming: Casual and Disheveled  Orientation: Alert, Confused, Disoriented to: recent events and Evidence of delusions present  Behavior: Unusual behaviors noted and anxious  Eye contact: Intense  Mood: Anxious  Affect: Constricted, Blunted and Anxious  Thought process: Goal-directed, Circumstantial, Tangential, Loose associations and Perseveration  Thought content: Preoccupation, Evidence of delusion and Paranoia  Speech: Volume within normal limits and Pressured  Perception: " Derealization  Memory:  No gross evidence of memory deficits  Insight: Adequate  Judgment:  Adequate  Other:    Collateral information:   Source:  [] Significant other present in person:   [] Significant other by telephone  [] [facility]   [x] [facility] Staff  [x] [facility]  [] Other:     [] Unable to complete full assessment due to:  [] Acute intoxication  [] Patient declined to participate/engage  [] Patient verbally unresponsive  [] Significant cognitive deficits  [] Significant perceptual distortions or behavioral disorganization  [] Other:      CLINICAL IMPRESSIONS:  Primary:  R/o methamphetamine induced psychosis  Secondary:  Possibly housing issues       IDENTIFIED NEEDS/PLAN:  [Trigger DISPOSITION list for any items marked]    []  Imminent safety risk - self [] Imminent safety risk - others   []  Acute substance withdrawal []  Psychosis/Impaired reality testing   [x]  Mood/anxiety [x]  Substance use/Addictive behavior   [x]  Maladaptive behaviro []  Parent/child conflict   []  Family/Couples conflict []  Biomedical   [x]  Housing []  Financial   []   Legal  Occupational/Educational   []  Domestic violence []  Other:     Recommended Plan of Care:  [facility]er to [facility], Peer Recovery Support Team, homeless resources; [employer] insurance plan; Writer RN reviewed community CD and homeless resources with pt, with written information given; writer RN to send pt referral to [facility] ([facility])l pt to talk with Peer Recovery ; pt to DC to self today to [facility]    Has the Recommended Plan of Care/Level of Observation been reviewed with the patient's assigned nurse? yes    Does patient/parent or guardian express agreement with the above plan? yes    Referral appointment(s) scheduled? no    Alert team only:   I have discussed findings and recommendations with Dr. Marie who is in agreement with these recommendations. Pt is not on a legal hold    Referral  information sent to the following community providers : [facility]    If applicable : Referred  to : ABIMAEL Barrientos R.N.  10/29/2021

## 2021-10-29 NOTE — DISCHARGE INSTRUCTIONS
You were seen in the ER for altered mental status.  You have been evaluated by our psychiatric team who has recommended outpatient management.  We are sending you to an outpatient facility to help you with your methamphetamine use.  Please continue never using drugs again.  Establish with a primary care physician and return immediately to the ER with new or worsening symptoms.  Good luck, I hope you feel better soon!

## 2021-10-29 NOTE — ED NOTES
"  Pharmacy Medication Reconciliation      ~Medication reconciliation updated and complete per pt at bedside & pt home pharmacy ([facility] in [place])  ~Allergies have been verified and updated   ~No oral ABX within the last 30 days    ~Pt reports she has a medication for \"breast cancer\", med rec unable to verify with pt home pharmacy. Pt has not ever filled any medication with [facility]            "

## 2021-11-06 ENCOUNTER — HOSPITAL ENCOUNTER (EMERGENCY)
Facility: MEDICAL CENTER | Age: 31
End: 2021-11-10
Attending: EMERGENCY MEDICINE
Payer: COMMERCIAL

## 2021-11-06 DIAGNOSIS — F15.10 AMPHETAMINE ABUSE (HCC): ICD-10-CM

## 2021-11-06 DIAGNOSIS — F22 DELUSIONAL DISORDER (HCC): ICD-10-CM

## 2021-11-06 PROCEDURE — A9270 NON-COVERED ITEM OR SERVICE: HCPCS | Performed by: EMERGENCY MEDICINE

## 2021-11-06 PROCEDURE — 700102 HCHG RX REV CODE 250 W/ 637 OVERRIDE(OP): Performed by: EMERGENCY MEDICINE

## 2021-11-06 PROCEDURE — 99285 EMERGENCY DEPT VISIT HI MDM: CPT

## 2021-11-06 RX ORDER — LORAZEPAM 2 MG/1
2 TABLET ORAL ONCE
Status: COMPLETED | OUTPATIENT
Start: 2021-11-06 | End: 2021-11-06

## 2021-11-06 RX ADMIN — LORAZEPAM 2 MG: 2 TABLET ORAL at 22:30

## 2021-11-06 ASSESSMENT — FIBROSIS 4 INDEX: FIB4 SCORE: 0.57

## 2021-11-07 LAB
AMPHET UR QL SCN: POSITIVE
APPEARANCE UR: ABNORMAL
BACTERIA #/AREA URNS HPF: ABNORMAL /HPF
BARBITURATES UR QL SCN: NEGATIVE
BENZODIAZ UR QL SCN: NEGATIVE
BILIRUB UR QL STRIP.AUTO: ABNORMAL
BZE UR QL SCN: NEGATIVE
CANNABINOIDS UR QL SCN: POSITIVE
COLOR UR: YELLOW
EPI CELLS #/AREA URNS HPF: NEGATIVE /HPF
GLUCOSE UR STRIP.AUTO-MCNC: NEGATIVE MG/DL
HCG UR QL: NEGATIVE
KETONES UR STRIP.AUTO-MCNC: NEGATIVE MG/DL
LEUKOCYTE ESTERASE UR QL STRIP.AUTO: NEGATIVE
METHADONE UR QL SCN: NEGATIVE
MICRO URNS: ABNORMAL
NITRITE UR QL STRIP.AUTO: NEGATIVE
OPIATES UR QL SCN: NEGATIVE
OXYCODONE UR QL SCN: NEGATIVE
PCP UR QL SCN: NEGATIVE
PH UR STRIP.AUTO: 5.5 [PH] (ref 5–8)
POC BREATHALIZER: 0 PERCENT (ref 0–0.01)
PROPOXYPH UR QL SCN: NEGATIVE
PROT UR QL STRIP: 30 MG/DL
RBC # URNS HPF: ABNORMAL /HPF
RBC UR QL AUTO: ABNORMAL
SP GR UR STRIP.AUTO: >=1.03
UROBILINOGEN UR STRIP.AUTO-MCNC: 1 MG/DL
WBC #/AREA URNS HPF: ABNORMAL /HPF

## 2021-11-07 PROCEDURE — 90791 PSYCH DIAGNOSTIC EVALUATION: CPT | Performed by: PSYCHOLOGIST

## 2021-11-07 PROCEDURE — 81025 URINE PREGNANCY TEST: CPT

## 2021-11-07 PROCEDURE — 81001 URINALYSIS AUTO W/SCOPE: CPT

## 2021-11-07 PROCEDURE — 80307 DRUG TEST PRSMV CHEM ANLYZR: CPT

## 2021-11-07 PROCEDURE — 302970 POC BREATHALIZER

## 2021-11-07 NOTE — ED NOTES
Unable to complete. Med rec at this time. Pt is unable to participate in an interview.  Not able to reach family at this time. No home pharmacy listed

## 2021-11-07 NOTE — PROGRESS NOTES
"ED Observation Progress Note    Date of Service: 11/07/21    Interval History  Patient care signed out to me from nighttime ERP.  Patient was here for a psychiatric evaluation.  She is noted to be delusional in the setting of methamphetamine use.  There was some concern that this may be psychosis due to methamphetamine use.  Await evaluation by the alert team after the patient is more sober.    Physical Exam  /93   Pulse 96   Temp 36.1 °C (96.9 °F) (Temporal)   Resp 18   Ht 1.727 m (5' 8\")   Wt 117 kg (258 lb)   LMP  (LMP Unknown) Comment: unable to asses   SpO2 96%   BMI 39.23 kg/m² .    Constitutional: Awake and alert. Nontoxic  HENT:  Grossly normal  Eyes: Grossly normal  Neck: Normal range of motion  Cardiovascular: Normal heart rate   Thorax & Lungs: No respiratory distress  Abdomen: Nontender  Skin:  No pathologic rash.   Extremities: Well perfused  Psychiatric: Affect normal    Labs  Results for orders placed or performed during the hospital encounter of 10/29/21   URINE DRUG SCREEN   Result Value Ref Range    Amphetamines Urine Positive (A) Negative    Barbiturates Negative Negative    Benzodiazepines Negative Negative    Cocaine Metabolite Negative Negative    Methadone Negative Negative    Opiates Negative Negative    Oxycodone Negative Negative    Phencyclidine -Pcp Negative Negative    Propoxyphene Negative Negative    Cannabinoid Metab Positive (A) Negative   POC BREATHALIZER   Result Value Ref Range    POC Breathalizer 0.00 0.00 - 0.01 Percent       Problem List  1.  Acute psychosis  2.  Delusional  3.  Methamphetamine abuse      Electronically signed by: Alexa Trimble D.O., 11/7/2021 12:48 PM    "
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
## 2021-11-07 NOTE — ED NOTES
Assumed care of pt. Pt asked to keep mask on face. Agreeable at this time. Behavioral health aware of pt.

## 2021-11-07 NOTE — ED NOTES
Report from RODRICK Martinez.    Patient resting on stretcher in no acute distress. Equal chest rise noted. Bed locked and in low position.

## 2021-11-07 NOTE — ED TRIAGE NOTES
BIBA from [facility], found by PD in the parking lot. Told PD she gave birth an hour ago to 27 kids. Pt states she used marijuana today because it is good for her bone cancer and that she also used meth. Reportedly takes depakote for bone cancer as well.

## 2021-11-07 NOTE — ED NOTES
Pt woken up, VS taken, encouraged to ambulate to bathroom to void, pt refused and went back to sleep.

## 2021-11-07 NOTE — ED PROVIDER NOTES
ED Provider Note    CHIEF COMPLAINT  Chief Complaint   Patient presents with   • Psych Eval       HPI  Sandra Sigala is a 31 y.o. female who presents with psychosis.  The patient is well-known to the emerge department for recurrent visits for methamphetamine abuse as well as delusional psychosis.  The patient cannot give any accurate history.  She states she is here because she feels she is pregnant and her water broke.  She had the same thoughts during her last visit.  The patient does not have any current medical complaints.  She denies drug abuse on my exam but according to her past history she has been abusing amphetamines.  She cannot tell me where she lives and history is otherwise unobtainable secondary to her altered state.    REVIEW OF SYSTEMS  See HPI for further details.  Unobtainable secondary the patient's altered state.     PAST MEDICAL HISTORY  No past medical history on file.    FAMILY HISTORY  [unfilled]    SOCIAL HISTORY  Social History     Socioeconomic History   • Marital status:      Spouse name: Not on file   • Number of children: Not on file   • Years of education: Not on file   • Highest education level: Not on file   Occupational History   • Not on file   Tobacco Use   • Smoking status: Current Every Day Smoker   • Smokeless tobacco: Never Used   Vaping Use   • Vaping Use: Never used   Substance and Sexual Activity   • Alcohol use: Yes   • Drug use: Yes     Comment: meth and marijuana every day   • Sexual activity: Not on file   Other Topics Concern   • Not on file   Social History Narrative   • Not on file     Social Determinants of Health     Financial Resource Strain:    • Difficulty of Paying Living Expenses:    Food Insecurity:    • Worried About Running Out of Food in the Last Year:    • Ran Out of Food in the Last Year:    Transportation Needs:    • Lack of Transportation (Medical):    • Lack of Transportation (Non-Medical):    Physical Activity:    • Days of Exercise per Week:   "  • Minutes of Exercise per Session:    Stress:    • Feeling of Stress :    Social Connections:    • Frequency of Communication with Friends and Family:    • Frequency of Social Gatherings with Friends and Family:    • Attends [employer] Services:    • Active Member of Clubs or Organizations:    • Attends Club or Organization Meetings:    • Marital Status:    Intimate Partner Violence:    • Fear of Current or Ex-Partner:    • Emotionally Abused:    • Physically Abused:    • Sexually Abused:        SURGICAL HISTORY  No past surgical history on file.    CURRENT MEDICATIONS  Home Medications     Reviewed by Abby Lou R.N. (Registered Nurse) on 11/06/21 at 2112  Med List Status: <None>   Medication Last Dose Status   divalproex (DEPAKOTE) 500 MG Tablet Delayed Response  Active                ALLERGIES  Allergies   Allergen Reactions   • Hydromorphone Swelling     Muscle spasm in throat         PHYSICAL EXAM  VITAL SIGNS: /78   Pulse 98   Temp 36.1 °C (97 °F) (Temporal)   Resp 20   Ht 1.727 m (5' 8\")   Wt 117 kg (258 lb)   LMP  (LMP Unknown) Comment: unable to asses   SpO2 99%   BMI 39.23 kg/m²       Constitutional: Agitated.   HENT: Normocephalic, Atraumatic, Bilateral external ears normal, Oropharynx moist, No oral exudates, Nose normal.   Eyes: PERRLA, EOMI, Conjunctiva normal, No discharge.   Neck: Normal range of motion, No tenderness, Supple, No stridor.   Lymphatic: No lymphadenopathy noted.   Cardiovascular: Normal heart rate, Normal rhythm, No murmurs, No rubs, No gallops.   Thorax & Lungs: Normal breath sounds, No respiratory distress, No wheezing, No chest tenderness.   Abdomen: Bowel sounds normal, Soft, No tenderness, No masses, No pulsatile masses.   Skin: Warm, Dry, No erythema, No rash.   Back: No tenderness, No CVA tenderness.   Extremities: Intact distal pulses, No edema, No tenderness, No cyanosis, No clubbing.   Neurologic: Alert & oriented x 3, Normal motor function, Normal " sensory function, No focal deficits noted.   Psychiatric: Pressured speech with flight of ideas and tangential thoughts.  The patient denies homicidal and suicidal ideation      COURSE & MEDICAL DECISION MAKING  Pertinent Labs & Imaging studies reviewed. (See chart for details)  This a 31-year-old female who presents the emergency department delusional.  I did review her records with similar presenting complaints in the past secondary to methamphetamine induced psychosis.  I suspect this is the source.  The patient will require prolonged monitoring and testing for drugs as well as a urine pregnancy test and urinalysis.  I did administer Ativan as it is very unlikely she is currently pregnant.  When the patient is more coherent metabolizes the suspected amphetamines she will require evaluation by life skills.    The patient will be admitted to emergency department observation on 11/6/2021 at 11:25 PM for further work-up of her delusional state.    FINAL IMPRESSION  1.  Acute psychosis  2.  Suspect methamphetamine abuse           Electronically signed by: Jacob Cruz M.D., 11/6/2021 9:34 PM

## 2021-11-07 NOTE — ED NOTES
Pt awake, denies need to void at this time.  Refusing to try for urine.  Provided with apple juice.

## 2021-11-08 PROCEDURE — 99281 EMR DPT VST MAYX REQ PHY/QHP: CPT | Mod: 95 | Performed by: STUDENT IN AN ORGANIZED HEALTH CARE EDUCATION/TRAINING PROGRAM

## 2021-11-08 PROCEDURE — A9270 NON-COVERED ITEM OR SERVICE: HCPCS | Performed by: EMERGENCY MEDICINE

## 2021-11-08 PROCEDURE — 700102 HCHG RX REV CODE 250 W/ 637 OVERRIDE(OP): Performed by: EMERGENCY MEDICINE

## 2021-11-08 RX ORDER — DIVALPROEX SODIUM 500 MG/1
500 TABLET, DELAYED RELEASE ORAL EVERY 12 HOURS
Status: DISCONTINUED | OUTPATIENT
Start: 2021-11-08 | End: 2021-11-10 | Stop reason: HOSPADM

## 2021-11-08 RX ADMIN — DIVALPROEX SODIUM 500 MG: 500 TABLET, DELAYED RELEASE ORAL at 21:02

## 2021-11-08 NOTE — PROGRESS NOTES
"ED Provider Progress Note    ED Observation Progress Note    Date of Service: 11/08/21    Interval History  Last 24 hours the patient has been placed on a legal hold.  Being referred for inpatient psychiatric treatment.  Did well overnight.  Patient is becoming more cooperative.    Physical Exam  /74   Pulse 88   Temp 36.9 °C (98.4 °F) (Temporal)   Resp 14   Ht 1.727 m (5' 8\")   Wt 117 kg (258 lb)   LMP  (LMP Unknown) Comment: unable to asses   SpO2 95%   BMI 39.23 kg/m² .    Constitutional: Awake and alert. Nontoxic  HENT:  Grossly normal  Eyes: Grossly normal  Neck: Normal range of motion  Cardiovascular: Normal heart rate   Thorax & Lungs: No respiratory distress  Abdomen: Nontender  Skin:  No pathologic rash.   Extremities: Well perfused  Psychiatric: Somewhat odd affect.    Labs  Results for orders placed or performed during the hospital encounter of 11/06/21   URINE DRUG SCREEN   Result Value Ref Range    Amphetamines Urine Positive (A) Negative    Barbiturates Negative Negative    Benzodiazepines Negative Negative    Cocaine Metabolite Negative Negative    Methadone Negative Negative    Opiates Negative Negative    Oxycodone Negative Negative    Phencyclidine -Pcp Negative Negative    Propoxyphene Negative Negative    Cannabinoid Metab Positive (A) Negative   URINALYSIS    Specimen: Urine, Clean Catch   Result Value Ref Range    Color Yellow     Character Cloudy (A)     Specific Gravity >=1.030 <1.035    Ph 5.5 5.0 - 8.0    Glucose Negative Negative mg/dL    Ketones Negative Negative mg/dL    Protein 30 (A) Negative mg/dL    Bilirubin Moderate (A) Negative    Urobilinogen, Urine 1.0 Negative    Nitrite Negative Negative    Leukocyte Esterase Negative Negative    Occult Blood Large (A) Negative    Micro Urine Req Microscopic    URINE MICROSCOPIC (W/UA)   Result Value Ref Range    WBC 2-5 /hpf    RBC  (A) /hpf    Bacteria Few (A) None /hpf    Epithelial Cells Negative /hpf   BETA-HCG " QUALITATIVE URINE   Result Value Ref Range    Beta-Hcg Urine Negative Negative   POC BREATHALIZER   Result Value Ref Range    POC Breathalizer 0.00 0.00 - 0.01 Percent       Radiology  No orders to display       Problem List  1. Delusional disorder (HCC)            Electronically signed by: Josep Luna M.D., 11/8/2021 11:48 AM

## 2021-11-08 NOTE — ED NOTES
Per Alert team RN. Patient ok to be in her clothes, have her belongings and make phone calls while on hold

## 2021-11-08 NOTE — DISCHARGE PLANNING
Medical Social Work    Referral: Legal Hold    Intervention: Legal Hold Paperwork given to SW by Alert Team: Bill    Legal Hold Initiated: Date: 11/07/2021  Time: 1603    Legal Hold faxed: Date: 11/08/2021  Time: 3428    Patient’s Insurance Listed on Face Sheet: [employer]    Referrals sent to: [facility] and [facility]    Plan: Patient will transfer to mental health facility once acceptance is obtained.

## 2021-11-08 NOTE — ED PROVIDER NOTES
"ED Provider Note    ED Observation Progress Note    Date of Service: 11/07/21    Interval History  The patient was originally seen by Dr. Cruz secondary to psychosis presentation likely from methamphetamine.  Ultimately the patient was placed on ED observation status at November 6 at 11:25 PM  Ultimately the patient was assessed by life skills under my watch and I filled out legal hold paperwork and the patient will need to go to a psychiatric facility and less they clear from medications started by telepsychiatry in the emergency department.  Patient is positive for amphetamines and cannabinoids on drug screen.  Patient has been given Ativan to date and has remained fairly calm.  I administered 1 dose of Risperdal to try to facilitate clearance of psychotic symptoms    Physical Exam  /80   Pulse 96   Temp 37.3 °C (99.1 °F) (Temporal)   Resp 18   Ht 1.727 m (5' 8\")   Wt 117 kg (258 lb)   LMP  (LMP Unknown) Comment: unable to asses   SpO2 97%   BMI 39.23 kg/m² .    Constitutional: Awake and alert. Nontoxic  HENT:  Grossly normal  Eyes: Grossly normal  Neck: Normal range of motion  Cardiovascular: Normal heart rate   Thorax & Lungs: No respiratory distress  Abdomen: Nontender  Skin:  No pathologic rash.   Extremities: Well perfused  Psychiatric: Affect somewhat bizarre, no suicidal or homicidal ideation but does have ongoing tangential and derailed thought process    Labs  Results for orders placed or performed during the hospital encounter of 10/29/21   URINE DRUG SCREEN   Result Value Ref Range    Amphetamines Urine Positive (A) Negative    Barbiturates Negative Negative    Benzodiazepines Negative Negative    Cocaine Metabolite Negative Negative    Methadone Negative Negative    Opiates Negative Negative    Oxycodone Negative Negative    Phencyclidine -Pcp Negative Negative    Propoxyphene Negative Negative    Cannabinoid Metab Positive (A) Negative   POC BREATHALIZER   Result Value Ref Range    " POC Breathalizer 0.00 0.00 - 0.01 Percent       Radiology  No orders to display     Patient is signed out to my partner at approximately 8 PM for ongoing care and remains on ED observation status awaiting placement at a psychiatric facility or improvement of symptoms    Problem List  1.  Methamphetamine induced psychosis      Electronically signed by: Rigo Paula M.D., 11/7/2021 7:39 PM

## 2021-11-08 NOTE — CONSULTS
"                  [facility] INITIAL PSYCHIATRIC EVALUATION    This provider informed the patient their medical records are totally confidential except for the use by other providers involved in their care, or if the patient signs a release, or to report instances of child or elder abuse, or if it is determined they are an immediate risk to harm themselves or others.    This evaluation was conducted via Zoom using secure and encrypted videoconferencing technology. The patient was physically located at [facility] in [place], [place]. The patient was presented by a medical professional at the originating site.  The patient's identity was confirmed and verbal consent was obtained for this telemedicine encounter.      Room:  30/30 GRN    CONSULTED BY:   Ayden Topete D.o.     REASON FOR CONSULT:   Psychiatric evaluation    Chief Complaint   Patient presents with   • Psych Eval         HISTORY OF PRESENT ILLNESS  Chart reviewed.  Patient seen at bedside through telemedicine. Patient is an obese white F, guarded on approach. She says that she came to the hospital because \"my water broke, but I still have my babies, I'm pregnant with 28 babies\", she reports having many, many kids and says that she wants to stay in a motel. She answers some questions inappropriately, tangential, with loose associations. She reports being on depakote and Latuda in the past, but she reports, \"I take latuda for cancer, but i'm getting social security for may years\". When asked about her social situation and where she is domilied, she unexpectedly becomes angry, \"you cant force feed me, let me leave, I want to leave, I'm going to sleep\".       MEDICAL REVIEW OF SYSTEMS:   ROS could not perform       PAST PSYCHIATRIC HISTORY  Self report hx of bipolar     FAMILY HISTORY  Unknown at this time       SOCIAL HISTORY  homeless    DRUGS: marijuana and methamphetamine     ALCOHOL: denied     TOBACCO: daily cigarette " "smoker    MEDICAL HISTORY       History reviewed. No pertinent past medical history.  No results found for this or any previous visit.    Lab Results   Component Value Date/Time    WBC 14.2 (H) 10/25/2021 02:00 AM    RBC 4.36 10/25/2021 02:00 AM    HEMOGLOBIN 13.2 10/25/2021 02:00 AM    HEMATOCRIT 40.5 10/25/2021 02:00 AM    MCV 92.9 10/25/2021 02:00 AM    MCH 30.3 10/25/2021 02:00 AM    MCHC 32.6 (L) 10/25/2021 02:00 AM    MPV 10.0 10/25/2021 02:00 AM    NEUTSPOLYS 69.20 10/25/2021 02:00 AM    LYMPHOCYTES 17.40 (L) 10/25/2021 02:00 AM    MONOCYTES 8.30 10/25/2021 02:00 AM    EOSINOPHILS 0.50 10/25/2021 02:00 AM    BASOPHILS 0.90 10/25/2021 02:00 AM    ANISOCYTOSIS 1+ 10/25/2021 02:00 AM      Lab Results   Component Value Date/Time    SODIUM 135 10/25/2021 02:00 AM    POTASSIUM 4.1 10/25/2021 02:00 AM    CHLORIDE 96 10/25/2021 02:00 AM    CO2 19 (L) 10/25/2021 02:00 AM    GLUCOSE 102 (H) 10/25/2021 02:00 AM    BUN 7 (L) 10/25/2021 02:00 AM    CREATININE 0.75 10/25/2021 02:00 AM        No results found for this or any previous visit from the past 1000 days.      CURRENT MEDICATIONS       No current facility-administered medications for this encounter.    Current Outpatient Medications:   •  divalproex, 500 mg, Oral, BID, 11/6/2021 at AM     ALLERGIES         Allergies   Allergen Reactions   • Hydromorphone Swelling     Muscle spasm in throat         MENTAL STATUS EXAMINATION    /74   Pulse 88   Temp 36.9 °C (98.4 °F) (Temporal)   Resp 14   Ht 1.727 m (5' 8\")   Wt 117 kg (258 lb)   LMP  (LMP Unknown) Comment: unable to asses   SpO2 95%   BMI 39.23 kg/m²   Participation: poor   Appearance: appears older than stated age, morbidly obese  Orientation: Alert and Confused  Eye contact: Intense  Behavior:Agitated   Mood: Angry  Affect: Constricted  Thought process: Circumstantial and Loose associations  Thought content:  Ideas of reference, Evidence of delusion and Paranoia  Speech: Loud and Rapid  Perception:  " Illusions  Memory:  Poor memory for chronology of events  Insight: Poor  Judgment: Poor  Family/couple interaction observations:   Other:    CURRENT RISK       Suicidal:Low       Homicidal:Low       Self-Harm:Low       Relapse:High       Crisis Safety Plan Reviewed No    ASSESSMENT       31 y.o. female with hx of mental illness and methamphetamine abuse, admitted on 11/6/2021 for , psychiatry consulted for evaluation. On exam guarded, disorganized, illogical, aggressive. Patient with erratic behavior, will benefit from psychiatric stabilization. Please transfer to psych hospital when bed available. Legal hold extended.       DSM-5 Dx  Psychosis nos   Bipolar dso by hx   Methamphetamine abuse disorder     PLAN  - Medications    Haldol 5mg PO/IM q6h prn agitation    Ativan 2mg PO/IM q6h prn anxiety   - Labs reviewed, UDS + meth and cannabis    - Imaging reviewed   -please monitor Qtc with neuroleptic use    Legal Hold: extended   Sitter: 1:1   Visitors: no   Personal Belongings: no   Phone: no

## 2021-11-08 NOTE — ED NOTES
Pt moved into GR 30, pt refusing to wear a pad at this time. Pt provided additional blankets and denies further needs.

## 2021-11-08 NOTE — ED NOTES
Patient resting on stretcher in no acute distress. Equal chest rise noted. Bed locked and in low position.

## 2021-11-08 NOTE — DISCHARGE PLANNING
Medical Social Work    MSW received a copy of pt's legal hold from Alert Team.  Unable to send mental health referrals pending breathalyzer and pregnancy test.  Bedside RN made aware.

## 2021-11-08 NOTE — ED NOTES
Attempted to breathalyze pt, pt not breathing out hard enough for a result. After 2 attempts, pt became agitated and refused to try again.

## 2021-11-08 NOTE — ED NOTES
"VS taken on patient and stable. She was encouraged to get up to use restroom to provide urine sample and clean up due to her dried blood from menses on her pants. Patient adamantly refused to get up or to change into any new clothes. Patient states \"I don't need to pee.\" \"I change my pad when I want!\"  "

## 2021-11-09 PROCEDURE — 700102 HCHG RX REV CODE 250 W/ 637 OVERRIDE(OP): Performed by: EMERGENCY MEDICINE

## 2021-11-09 PROCEDURE — A9270 NON-COVERED ITEM OR SERVICE: HCPCS | Performed by: EMERGENCY MEDICINE

## 2021-11-09 RX ADMIN — DIVALPROEX SODIUM 500 MG: 500 TABLET, DELAYED RELEASE ORAL at 20:38

## 2021-11-09 RX ADMIN — DIVALPROEX SODIUM 500 MG: 500 TABLET, DELAYED RELEASE ORAL at 10:44

## 2021-11-09 NOTE — ED NOTES
Pt still covered in period blood. Offered clean gowns--still adamantly refusing to take off soiled sweat pants becoming aggressive with RN.

## 2021-11-09 NOTE — PROGRESS NOTES
"ED Provider Progress Note    ED Observation Progress Note    Date of Service: 11/09/21    Interval History  Patient has done well overnight.  Has been more cooperative.  Taking her medications voluntarily.  Has no complaints at this time.  Legal hold has been extended.  Continues to await transfer to an inpatient psychiatric facility.    Physical Exam  /83   Pulse 81   Temp 37.3 °C (99.1 °F) (Temporal)   Resp 17   Ht 1.727 m (5' 8\")   Wt 117 kg (258 lb)   LMP  (LMP Unknown) Comment: unable to asses   SpO2 97%   BMI 39.23 kg/m² .    Constitutional: Awake and alert. Nontoxic  HENT:  Grossly normal  Eyes: Grossly normal  Neck: Normal range of motion  Cardiovascular: Normal heart rate   Thorax & Lungs: No respiratory distress  Abdomen: Nontender  Skin:  No pathologic rash.   Extremities: Well perfused  Psychiatric: Affect normal    Labs  Results for orders placed or performed during the hospital encounter of 11/06/21   URINE DRUG SCREEN   Result Value Ref Range    Amphetamines Urine Positive (A) Negative    Barbiturates Negative Negative    Benzodiazepines Negative Negative    Cocaine Metabolite Negative Negative    Methadone Negative Negative    Opiates Negative Negative    Oxycodone Negative Negative    Phencyclidine -Pcp Negative Negative    Propoxyphene Negative Negative    Cannabinoid Metab Positive (A) Negative   URINALYSIS    Specimen: Urine, Clean Catch   Result Value Ref Range    Color Yellow     Character Cloudy (A)     Specific Gravity >=1.030 <1.035    Ph 5.5 5.0 - 8.0    Glucose Negative Negative mg/dL    Ketones Negative Negative mg/dL    Protein 30 (A) Negative mg/dL    Bilirubin Moderate (A) Negative    Urobilinogen, Urine 1.0 Negative    Nitrite Negative Negative    Leukocyte Esterase Negative Negative    Occult Blood Large (A) Negative    Micro Urine Req Microscopic    URINE MICROSCOPIC (W/UA)   Result Value Ref Range    WBC 2-5 /hpf    RBC  (A) /hpf    Bacteria Few (A) None /hpf    " Epithelial Cells Negative /hpf   BETA-HCG QUALITATIVE URINE   Result Value Ref Range    Beta-Hcg Urine Negative Negative   POC BREATHALIZER   Result Value Ref Range    POC Breathalizer 0.00 0.00 - 0.01 Percent       Radiology  No orders to display       Problem List    1. Delusional disorder (HCC)            Electronically signed by: Josep Luna M.D., 11/9/2021 10:57 AM

## 2021-11-09 NOTE — ED NOTES
Pt's mother called and spoke to them - gave update on patient and POC.     Mothers number not correct on emergency contact: Val Moser : [phone]

## 2021-11-09 NOTE — DISCHARGE PLANNING
Alert Team:    Patient rested throughout the night without incident; no PRN medications requested or required during PM shift. Patient compliant with scheduled Depakote. Legal hold extended and awaiting transfer to psychiatric facility. Patient noted to be calm and cooperative, no SI/HI; Q 15 checks appropriate at present with ability for bedside RN to escalate to 1:1 if acuity increases. Alert Team will continue to monitor.

## 2021-11-09 NOTE — ED NOTES
Pt given opportunity to shower and refusing all cares at this time. Pt is cooperative and calm. Refusing linen change. No issues noted

## 2021-11-09 NOTE — ED NOTES
Patient's home medications have been reviewed by the pharmacy team.     Patient's Medications   New Prescriptions    No medications on file   Previous Medications    DIVALPROEX (DEPAKOTE) 500 MG TABLET DELAYED RESPONSE    Take 500 mg by mouth 2 times a day.   Modified Medications    No medications on file   Discontinued Medications    No medications on file         A:  Medications do not appear to be contributing to current complaints.       P:    Discussed with ERP. Home medications have been reordered as appropriate.      Rivas Martinez, PharmD, BCPS

## 2021-11-09 NOTE — ED NOTES
Pt covered in period blood adamantly refusing to take off soiled sweat pants becoming aggressive with RN.

## 2021-11-10 VITALS
BODY MASS INDEX: 39.1 KG/M2 | HEIGHT: 68 IN | RESPIRATION RATE: 19 BRPM | OXYGEN SATURATION: 94 % | TEMPERATURE: 97 F | DIASTOLIC BLOOD PRESSURE: 91 MMHG | HEART RATE: 92 BPM | WEIGHT: 258 LBS | SYSTOLIC BLOOD PRESSURE: 128 MMHG

## 2021-11-10 LAB
SARS-COV+SARS-COV-2 AG RESP QL IA.RAPID: NOTDETECTED
SPECIMEN SOURCE: NORMAL

## 2021-11-10 PROCEDURE — 87426 SARSCOV CORONAVIRUS AG IA: CPT

## 2021-11-10 PROCEDURE — 700102 HCHG RX REV CODE 250 W/ 637 OVERRIDE(OP): Performed by: EMERGENCY MEDICINE

## 2021-11-10 PROCEDURE — A9270 NON-COVERED ITEM OR SERVICE: HCPCS | Performed by: EMERGENCY MEDICINE

## 2021-11-10 RX ADMIN — DIVALPROEX SODIUM 500 MG: 500 TABLET, DELAYED RELEASE ORAL at 09:32

## 2021-11-10 NOTE — DISCHARGE PLANNING
Alert Team:     Patient resting throughout the night without incident; Q 15 minute observations in place; no PRN medications requested or required during PM shift. Patient compliant with scheduled Depakote. Legal hold extended and awaiting transfer to psychiatric facility. Alert Team will continue to monitor.

## 2021-11-10 NOTE — DISCHARGE PLANNING
Received Verified Involuntary Court Ordered Petition from the court. Scanned copy of Petition into pt's chart, sent copy to JAN Johnson.

## 2021-11-10 NOTE — ED NOTES
Pt continues to rest with even chest rise and fall, repositioning self as needed. Sitter relocated to another pt. Q15min checks in place.

## 2021-11-10 NOTE — ED NOTES
Receieved report from RODRICK Dowling and assumed care of pt. Q15 checks in place. Pt resting in bed.

## 2021-11-10 NOTE — DISCHARGE SUMMARY
"  ED Observation Discharge Summary    Patient:Sandra Sigala  Patient : 1990  Patient MRN: [id number]  Patient PCP: No primary care provider on file.    Admit Date: 2021  Discharge Date and Time: 11/10/21 10:40 AM  Discharge Diagnosis:   1. Delusional disorder (HCC)    2. Amphetamine abuse (HCC)       Discharge Attending: Reno Garza M.D.  Discharge Service: ED Observation    ED Course  Sandra is a 31 y.o. female who was evaluated at [facility] for psychosis and methamphetamine abuse. The patient was seen by psychiatry, legal hold was extended. The patient was accepted to [facility] and transferred today at noon.  Discharge Exam:  /91   Pulse 92   Temp 36.1 °C (97 °F) (Temporal)   Resp 19   Ht 1.727 m (5' 8\")   Wt 117 kg (258 lb)   LMP  (LMP Unknown) Comment: unable to asses   SpO2 94%   BMI 39.23 kg/m² .    Constitutional: Awake and alert. Nontoxic  HENT:  Grossly normal  Eyes: Grossly normal  Neck: Normal range of motion  Cardiovascular: Normal heart rate   Thorax & Lungs: No respiratory distress  Abdomen: Nontender  Skin:  No pathologic rash.   Extremities: Well perfused  Psychiatric: Affect normal    Labs  Results for orders placed or performed during the hospital encounter of 21   URINE DRUG SCREEN   Result Value Ref Range    Amphetamines Urine Positive (A) Negative    Barbiturates Negative Negative    Benzodiazepines Negative Negative    Cocaine Metabolite Negative Negative    Methadone Negative Negative    Opiates Negative Negative    Oxycodone Negative Negative    Phencyclidine -Pcp Negative Negative    Propoxyphene Negative Negative    Cannabinoid Metab Positive (A) Negative   URINALYSIS    Specimen: Urine, Clean Catch   Result Value Ref Range    Color Yellow     Character Cloudy (A)     Specific Gravity >=1.030 <1.035    Ph 5.5 5.0 - 8.0    Glucose Negative Negative mg/dL    Ketones Negative Negative mg/dL    Protein 30 (A) Negative mg/dL    Bilirubin " Moderate (A) Negative    Urobilinogen, Urine 1.0 Negative    Nitrite Negative Negative    Leukocyte Esterase Negative Negative    Occult Blood Large (A) Negative    Micro Urine Req Microscopic    URINE MICROSCOPIC (W/UA)   Result Value Ref Range    WBC 2-5 /hpf    RBC  (A) /hpf    Bacteria Few (A) None /hpf    Epithelial Cells Negative /hpf   BETA-HCG QUALITATIVE URINE   Result Value Ref Range    Beta-Hcg Urine Negative Negative   SARS-COV Antigen MASON: Collect dry nasal swab   Result Value Ref Range    SARS-CoV-2 Source Nasal Swab     SARS-COV ANTIGEN MASON NotDetected NotDetected   POC BREATHALIZER   Result Value Ref Range    POC Breathalizer 0.00 0.00 - 0.01 Percent       Radiology  No orders to display       Disposition: home    Follow up: Directly to the mental health facility.  Medications:   Discharge Medication List as of 11/10/2021 12:07 PM          Discharge Condition: Stable    Electronically signed by: Reno Garza M.D., 11/10/2021 10:40 AM

## 2021-11-10 NOTE — DISCHARGE PLANNING
Anticipated Discharge Disposition: [facility]      Action/Information: [facility] called and they can accept pt today around 12. SW requested 12pm transport though [facility], which is confirmed. SW completed transfer packet and placed on chart with original legal hold. SACHIN updated RN and MD.      Barriers: None anticipated      Plan: Discharge at 12pm via [facility] to [facility]

## 2021-11-10 NOTE — DISCHARGE PLANNING
SW spoke to [facility] and they have confirmed they have Pt referral pending they do not have beds available.

## 2021-11-10 NOTE — ED NOTES
Report to EMS. File with facesheet, original L2K, and all patient belongings with EMS. Patient ambulatory to ambulance bay with EMS without incident for transfer to [facility].

## 2021-11-10 NOTE — DISCHARGE PLANNING
Alert Team  Pt continues to await transfer to inpt psych facility [facility].  Pt has [employer] insurance, so only option is [facility].  Pt saw psychiatry 11/8; LH extended.  She was not initially compliant with ordered depakote, but took doses last night and this morning.  Pt notably verbally aggressive with psychiatrist yesterday on telemed, as well as RN today.  She has been neglecting her hygiene today, laying in menses soiled pants and refusing to change them.  Per AT, she is currently on q15 minute checks.  WCTM

## 2021-11-10 NOTE — ED NOTES
Assumed pt care, report received. Pt resting supine on gurney in NAD with 1:1 sitter in direct view of patient.

## 2021-11-10 NOTE — ED NOTES
COVID swab collected and sent per [facility] policy. Pt now agreeable to remove clothes. All clothes and personal belongings removed. Clean linens, gown, underwear, and pad provided. Q15min checks in place.

## 2022-02-10 ENCOUNTER — HOSPITAL ENCOUNTER (EMERGENCY)
Facility: MEDICAL CENTER | Age: 32
End: 2022-02-10
Payer: COMMERCIAL

## 2022-02-10 VITALS
WEIGHT: 245 LBS | BODY MASS INDEX: 37.13 KG/M2 | HEIGHT: 68 IN | SYSTOLIC BLOOD PRESSURE: 161 MMHG | DIASTOLIC BLOOD PRESSURE: 92 MMHG | TEMPERATURE: 96.9 F | HEART RATE: 125 BPM | OXYGEN SATURATION: 97 % | RESPIRATION RATE: 22 BRPM

## 2022-02-10 PROCEDURE — 302449 STATCHG TRIAGE ONLY (STATISTIC)

## 2022-02-10 ASSESSMENT — FIBROSIS 4 INDEX: FIB4 SCORE: 0.57

## 2022-02-15 ENCOUNTER — HOSPITAL ENCOUNTER (EMERGENCY)
Facility: MEDICAL CENTER | Age: 32
End: 2022-02-15
Attending: EMERGENCY MEDICINE
Payer: COMMERCIAL

## 2022-02-15 VITALS
DIASTOLIC BLOOD PRESSURE: 79 MMHG | RESPIRATION RATE: 15 BRPM | OXYGEN SATURATION: 95 % | TEMPERATURE: 97 F | HEART RATE: 101 BPM | BODY MASS INDEX: 44.41 KG/M2 | WEIGHT: 293 LBS | HEIGHT: 68 IN | SYSTOLIC BLOOD PRESSURE: 148 MMHG

## 2022-02-15 DIAGNOSIS — Z59.00 HOMELESSNESS: ICD-10-CM

## 2022-02-15 DIAGNOSIS — R45.1 AGITATION: ICD-10-CM

## 2022-02-15 DIAGNOSIS — F15.10 METHAMPHETAMINE ABUSE (HCC): ICD-10-CM

## 2022-02-15 LAB
GLUCOSE BLD-MCNC: 108 MG/DL (ref 65–99)
POC BREATHALIZER: 0 PERCENT (ref 0–0.01)
POC BREATHALIZER: 0 PERCENT (ref 0–0.01)

## 2022-02-15 PROCEDURE — 90791 PSYCH DIAGNOSTIC EVALUATION: CPT

## 2022-02-15 PROCEDURE — 82962 GLUCOSE BLOOD TEST: CPT

## 2022-02-15 PROCEDURE — 99284 EMERGENCY DEPT VISIT MOD MDM: CPT

## 2022-02-15 PROCEDURE — 302970 POC BREATHALIZER: Performed by: EMERGENCY MEDICINE

## 2022-02-15 SDOH — ECONOMIC STABILITY - HOUSING INSECURITY: HOMELESSNESS UNSPECIFIED: Z59.00

## 2022-02-15 ASSESSMENT — FIBROSIS 4 INDEX: FIB4 SCORE: 0.57

## 2022-02-15 NOTE — ED NOTES
"Pt.was banging on triage doors yelling \"I need to get back there right now\"  Pt was approached calmly and explained that we are working on getting her a room pt then charged at medical staff while screaming \"you asshole\"  Security and medical staff aware of aggressive behavior and  will be provided to pt room  "

## 2022-02-15 NOTE — ED NOTES
"Pt states \"I pregnant\" \"I already had the babies\"  She states \"Depoke is street named meth.\" She has dirty hair and clothing on arrival. Changed into hospital gown.   "

## 2022-02-15 NOTE — ED TRIAGE NOTES
"Chief Complaint   Patient presents with   • Psych Eval     Pt having flight of ideas. Not answering questions appropriately. Pt tells triage RN \"I am giving birth to 18 kids right now\". 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 Pt reports she is paralyzed. Pt amb in triage with steady gait.       /81   Pulse (!) 111   Temp 35.9 °C (96.6 °F) (Temporal)   Resp 16   Ht 1.727 m (5' 8\")   Wt (!) 149 kg (327 lb 6.1 oz)   SpO2 94%   BMI 49.78 kg/m²     "
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
## 2022-02-16 NOTE — ED NOTES
Report from RODRICK Hampton  Pt sleeping in [facility], even and unlabored chest rise and fall.

## 2022-02-16 NOTE — ED NOTES
Patient is now call and cooperative with staff. She is able to use a wash cloth to clean up in bathroom. +urinary incontinence . States she has been banned from homeless shelter and she doesn’t have anywhere to go.

## 2022-02-16 NOTE — ED NOTES
"Pt discharged to lobby via ambulatory with steady gait. Pt provided clothing, shoes, and cab voucher. Pt in possession of belongings. Pt provided discharge education and information pertaining to medications and follow up appointments. Pt received copy of discharge instructions and verbalized understanding. /79   Pulse (!) 101   Temp 36.1 °C (97 °F) (Temporal)   Resp 15   Ht 1.727 m (5' 8\")   Wt (!) 149 kg (327 lb 6.1 oz)   SpO2 95%   BMI 49.78 kg/m²   "

## 2022-02-16 NOTE — ED NOTES
Pt resting in bed. Ate 100% of meal.   RN placed belongings in locker 12 except for large white blanket that was place on top of lockers

## 2022-02-16 NOTE — CONSULTS
"[facility]   TRIAGE ASSESSMENT    Name: Sandra Sigala  MRN: [id number]  : 1990  Age: 31 y.o.  Date of assessment: 2/15/2022  PCP: No primary care provider on file.  Persons in attendance: Patient  Patient Location: [facility]    CHIEF COMPLAINT/PRESENTING ISSUE (as stated by Patient, ER RN ):   Chief Complaint   Patient presents with   • Psych Eval     Pt having flight of ideas. Not answering questions appropriately. Pt tells triage RN \"I am giving birth to 18 kids right now\". 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 Pt reports she is paralyzed. Pt amb in triage with steady gait.          CURRENT LIVING SITUATION/SOCIAL SUPPORT/FINANCIAL RESOURCES: Patient is currently unsheltered; vocalizing desire to return to [place] where she had relocated from last ; unemployed, receives SSD, states \"$2400 a month\"     BEHAVIORAL HEALTH/SUBSTANCE USE TREATMENT HISTORY  Does patient/parent report a history of prior behavioral health/substance use treatment for patient?   Dates Level of Care Facilty/Provider Diagnosis/Problem Medications           [place], with [facility] homelessness       Reports she is not connected to [facility] treatment; encouraged patient to connect with Medicaid rep that frequent the [facility] to switch to [place] Medicaid id she plans to stay in [place]. Pt again states she wants to return to [place], [place].    SAFETY ASSESSMENT - SELF  Does patient acknowledge current or past symptoms of dangerousness to self or is previous history noted? no  Does parent/significant other report patient has current or past symptoms of dangerousness to self? N\A  Does presenting problem suggest symptoms of dangerousness to self? No; denies SI    SAFETY ASSESSMENT - OTHERS  Does patient acknowledge current or past symptoms of aggressive behavior or risk to others or is previous history noted? no  Does " "parent/significant other report patient has current or past symptoms of aggressive behavior or risk to others?  N\A  Does presenting problem suggest symptoms of dangerousness to others? No; denies HI    LEGAL HISTORY  Does patient acknowledge history of arrest/group home/[facility] or is previous history noted? Yes; h/o arrests for [facility]    Crisis Safety Plan completed and copy given to patient? N\A    ABUSE/NEGLECT SCREENING  Does patient report feeling “unsafe” in his/her home, or afraid of anyone?  no  Does patient report any history of physical, sexual, or emotional abuse?  no  Does parent or significant other report any of the above? N\A  Is there evidence of neglect by self?  yes  Is there evidence of neglect by a caregiver? no  Does the patient/parent report any history of CPS/APS/police involvement related to suspected abuse/neglect or domestic violence? no  Based on the information provided during the current assessment, is a mandated report of suspected abuse/neglect being made?  No    SUBSTANCE USE SCREENING  Yes:  Sung all substances used in the past 30 days:      Last Use Amount   []   Alcohol     []   Marijuana     []   Heroin     []   Prescription Opioids  (used without prescription, for    recreation, or in excess of prescribed amount)     []   Other Prescription  (used without prescription, for    recreation, or in excess of prescribed amount)     []   Cocaine      []   Methamphetamine     []   \"\" drugs (ectasy, MDMA)     []   Other substances        UDS results: pending collection  Breathalyzer results: 0.00    What consequences does the patient associate with any of the above substance use and or addictive behaviors? Health problems: hx of substance use    Risk factors for detox (check all that apply):  []  Seizures   []  Diaphoretic (sweating)   []  Tremors   []  Hallucinations   []  Increased blood pressure   []  Decreased blood pressure   []  Other   [x]  None      [x] Patient education on " risk factors for detoxification and instructed to return to ER as needed.      MENTAL STATUS   Participation: Active verbal participation  Grooming: Disheveled and Inappropriate to weather  Orientation: Alert and Confused  Behavior: Calm  Eye contact: Good  Mood: Euthymic  Affect: Blunted  Thought process: Goal-directed and Tangential  Thought content: Preoccupation  Speech: Rate within normal limits and Volume within normal limits  Perception: Within normal limits  Memory:  Recent:  Limited and Remote:  Limited  Insight: Limited  Judgment:  Limited  Other:    Collateral information:   Source:  [] Significant other present in person:   [] Significant other by telephone  [] [facility]   [x] [facility] Nursing Staff  [x] [facility]  [x] Other: ERP    [] Unable to complete full assessment due to:  [] Acute intoxication  [] Patient declined to participate/engage  [] Patient verbally unresponsive  [] Significant cognitive deficits  [] Significant perceptual distortions or behavioral disorganization  [x] Other: N/A     CLINICAL IMPRESSIONS:  Primary:  R/o methamphetamine induced psychosis  Secondary: homelessness       IDENTIFIED NEEDS/PLAN:  [Trigger DISPOSITION list for any items marked]    []  Imminent safety risk - self [] Imminent safety risk - others   []  Acute substance withdrawal [x]  Psychosis/Impaired reality testing   [x]  Mood/anxiety [x]  Substance use/Addictive behavior   [x]  Maladaptive behaviro []  Parent/child conflict   []  Family/Couples conflict []  Biomedical   [x]  Housing []  Financial   []   Legal  Occupational/Educational   []  Domestic violence []  Other:     Recommended Plan of Care: Refer to [facility], Peer Recovery Support Team, homeless resources; [employer] insurance plan; Writer RN reviewed community CD and homeless resources with pt, with written information given.    Has the Recommended Plan of Care/Level of Observation been reviewed with the patient's  assigned nurse? yes    Does patient/parent or guardian express agreement with the above plan? yes    If a pediatric/adolescent patient, have out of town/out of state inpatient MH tx options been reviewed with parent/legal guardian with verbal consent given for referrals to be sent? yes    Referral appointment(s) scheduled? N\A    Alert team only: Patient is goal oriented and able to vocalize where to obtain resources in the community to care for self. No legal hold required. Provided transport back to [facility] and encouraged to f/u with CM regarding HELP (Homeless Evaluation Liaison Program) through [facility] for possible assistance to return to family in [place].   I have discussed findings and recommendations with Dr. Gloria who is in agreement with these recommendations.     Referral information sent to the following outpatient community providers : N/A    Referral information sent to the following inpatient community providers : N/A        Amara Mcintosh R.N.  2/15/2022

## 2022-02-24 ENCOUNTER — HOSPITAL ENCOUNTER (EMERGENCY)
Facility: MEDICAL CENTER | Age: 32
End: 2022-02-24
Attending: EMERGENCY MEDICINE
Payer: COMMERCIAL

## 2022-02-24 VITALS
WEIGHT: 220 LBS | BODY MASS INDEX: 33.34 KG/M2 | DIASTOLIC BLOOD PRESSURE: 73 MMHG | RESPIRATION RATE: 15 BRPM | HEIGHT: 68 IN | SYSTOLIC BLOOD PRESSURE: 144 MMHG | TEMPERATURE: 97 F | HEART RATE: 103 BPM | OXYGEN SATURATION: 95 %

## 2022-02-24 DIAGNOSIS — F22 DELUSION (HCC): ICD-10-CM

## 2022-02-24 DIAGNOSIS — F41.9 ANXIETY: ICD-10-CM

## 2022-02-24 LAB
AMPHET UR QL SCN: NEGATIVE
BARBITURATES UR QL SCN: NEGATIVE
BENZODIAZ UR QL SCN: NEGATIVE
BZE UR QL SCN: NEGATIVE
CANNABINOIDS UR QL SCN: POSITIVE
METHADONE UR QL SCN: NEGATIVE
OPIATES UR QL SCN: NEGATIVE
OXYCODONE UR QL SCN: NEGATIVE
PCP UR QL SCN: NEGATIVE
POC BREATHALIZER: 0 PERCENT (ref 0–0.01)
PROPOXYPH UR QL SCN: NEGATIVE

## 2022-02-24 PROCEDURE — 700102 HCHG RX REV CODE 250 W/ 637 OVERRIDE(OP): Performed by: EMERGENCY MEDICINE

## 2022-02-24 PROCEDURE — 302970 POC BREATHALIZER: Performed by: EMERGENCY MEDICINE

## 2022-02-24 PROCEDURE — 302970 POC BREATHALIZER

## 2022-02-24 PROCEDURE — A9270 NON-COVERED ITEM OR SERVICE: HCPCS | Performed by: EMERGENCY MEDICINE

## 2022-02-24 PROCEDURE — 90791 PSYCH DIAGNOSTIC EVALUATION: CPT

## 2022-02-24 PROCEDURE — 80307 DRUG TEST PRSMV CHEM ANLYZR: CPT

## 2022-02-24 PROCEDURE — 99285 EMERGENCY DEPT VISIT HI MDM: CPT

## 2022-02-24 RX ORDER — DIVALPROEX SODIUM 500 MG/1
1000 TABLET, DELAYED RELEASE ORAL ONCE
Status: COMPLETED | OUTPATIENT
Start: 2022-02-24 | End: 2022-02-24

## 2022-02-24 RX ORDER — HYDROXYZINE HYDROCHLORIDE 25 MG/1
50 TABLET, FILM COATED ORAL ONCE
Status: COMPLETED | OUTPATIENT
Start: 2022-02-24 | End: 2022-02-24

## 2022-02-24 RX ADMIN — DIVALPROEX SODIUM 1000 MG: 500 TABLET, DELAYED RELEASE ORAL at 13:10

## 2022-02-24 RX ADMIN — HYDROXYZINE HYDROCHLORIDE 50 MG: 25 TABLET, FILM COATED ORAL at 13:10

## 2022-02-24 ASSESSMENT — FIBROSIS 4 INDEX: FIB4 SCORE: 0.57

## 2022-02-24 NOTE — CONSULTS
"[facility]   TRIAGE ASSESSMENT    Name: Sandra Sigala  MRN: [id number]  : 1990  Age: 31 y.o.  Date of assessment: 2022  PCP: No primary care provider on file.  Persons in attendance: Patient  Patient Location: [facility]    CHIEF COMPLAINT/PRESENTING ISSUE (as stated by patient): 31 year old female BIB EMS, legal hold, disorganized thoughts and behaviors; to note, pt with chronic mental illness and chronically delusional, paranoid despite antipsychotic med mgmt;  As note per previous [facility] ED visit 2/15/22 with DC to self \"I am giving birth to 18 kids right now\" and 2021 with DC to [facility] inpt \"\"my water broke, but I still have my babies, I'm pregnant with 28 babies\"; writer RN familiar with pt while at [facility], pt cont with chronic delusions, paranoia, wanting to \"go to nursing home\" and her medications were \"meth\" and cont with chronic fixed delusions of being \"pregnant\";Noted psych diagnoses include Psychosis nos, Bipolar dso by hx, Methamphetamine abuse disorder; pt recently DC'd from [facility] 2 weeks ago, to the homeless shelter; she has been residing at the [facility] homeless shelter for a few days this week where legal hold initiated today for inability to take care of self; per [facility] shelter, pt is \"too acute\" for their services; today, pt missed her outpt MH f/u appt at [facility] 22 and states she \"lost\" her psych medication RXs, including Depakote 1000 ER 1000 mg PO BID, Clozaril 300 mg PO QHS; currently, states some recent THC use but denies other substance use including Methamphetamines; pt presents with chronic fixed delusions, no acute MH crisis at this time    Pt received Depakote  mg PO and Vistaril 50 mg PO today at 1310    Writer RN LVM at [facility] outpt MH clinic, [phone], to reschedule pt appt she missed 22        Chief Complaint   Patient presents with   • Legal 2000        CURRENT LIVING SITUATION/SOCIAL SUPPORT/FINANCIAL RESOURCES: " [facility]    BEHAVIORAL HEALTH/SUBSTANCE USE TREATMENT HISTORY  Does patient/parent report a history of prior behavioral health/substance use treatment for patient?   Dates Level of Care Facilty/Provider Diagnosis/Problem Medications   [facility] 1000 mg PO BID, Clozaril 300 mg PO QHS   [facility] [facility] [place]           SAFETY ASSESSMENT - SELF  Does patient acknowledge current or past symptoms of dangerousness to self or is previous history noted? no  Does parent/significant other report patient has current or past symptoms of dangerousness to self? N\A  Does presenting problem suggest symptoms of dangerousness to self? No    SAFETY ASSESSMENT - OTHERS  Does patient acknowledge current or past symptoms of aggressive behavior or risk to others or is previous history noted? no  Does parent/significant other report patient has current or past symptoms of aggressive behavior or risk to others?  N\A  Does presenting problem suggest symptoms of dangerousness to others? No    LEGAL HISTORY  Does patient acknowledge history of arrest/[facility]/correction or is previous history noted? no    Crisis Safety Plan completed and copy given to patient? N\A    ABUSE/NEGLECT SCREENING  Does patient report feeling “unsafe” in his/her home, or afraid of anyone?  no  Does patient report any history of physical, sexual, or emotional abuse?  no  Does parent or significant other report any of the above? N\A  Is there evidence of neglect by self?  no  Is there evidence of neglect by a caregiver? no  Does the patient/parent report any history of CPS/APS/police involvement related to suspected abuse/neglect or domestic violence? no  Based on the information provided during the current assessment, is a mandated report of suspected abuse/neglect being made?  No    SUBSTANCE USE SCREENING  Yes:   "Sung all substances used in the past 30 days:      Last Use Amount   []   Alcohol     [x]   Marijuana 2/23/22 occassionaly   []   Heroin     []   Prescription Opioids  (used without prescription, for    recreation, or in excess of prescribed amount)     []   Other Prescription  (used without prescription, for    recreation, or in excess of prescribed amount)     []   Cocaine      []   Methamphetamine     []   \"\" drugs (ectasy, MDMA)     []   Other substances        UDS results: + THC  Breathalyzer results: negative    What consequences does the patient associate with any of the above substance use and or addictive behaviors? None    Risk factors for detox (check all that apply):  []  Seizures   []  Diaphoretic (sweating)   []  Tremors   []  Hallucinations   []  Increased blood pressure   []  Decreased blood pressure   []  Other   [x]  None      [] Patient education on risk factors for detoxification and instructed to return to ER as needed.      MENTAL STATUS   Participation: Limited verbal participation and Open to feedback  Grooming: Casual and Disheveled  Orientation: Alert, Disoriented to: date, recent events and Evidence of hallucinations present  Behavior: irritable  Eye contact: Intense  Mood: Anxious and Irritable  Affect: Constricted, Blunted, Flat, Anxious and Angry  Thought process: Goal-directed, Loose associations and Perseveration  Thought content: Preoccupation, Evidence of delusion and Paranoia  Speech: Volume within normal limits and Pressured  Perception: Derealization  Memory:  Poor memory for chronology of events  Insight: Adequate  Judgment:  Adequate  Other:    Collateral information:   Source:  [] Significant other present in person:   [] Significant other by telephone  [] [facility]   [x] [facility]  [x] [facility]  [] Other:     [] Unable to complete full assessment due to:  [] Acute intoxication  [] Patient declined to participate/engage  [] Patient " verbally unresponsive  [] Significant cognitive deficits  [] Significant perceptual distortions or behavioral disorganization  [] Other:      CLINICAL IMPRESSIONS:  Primary:  Psychosis NOS  Secondary:  hoemeless       IDENTIFIED NEEDS/PLAN:  [Trigger DISPOSITION list for any items marked]    []  Imminent safety risk - self [] Imminent safety risk - others   []  Acute substance withdrawal []  Psychosis/Impaired reality testing   [x]  Mood/anxiety []  Substance use/Addictive behavior   [x]  Maladaptive behaviro []  Parent/child conflict   []  Family/Couples conflict []  Biomedical   [x]  Housing [x]  Financial   []   Legal  Occupational/Educational   []  Domestic violence []  Other:     Recommended Plan of Care:  Refer to [facility], [facility], [facility] and [facility] ([facility]), VO, homeless resources; [employer] insurance plan; writer RN reviewed [facility] and homeless resources with  pt, with written information given, and encouraged pt to apply for a [place] plan if she stays in [place]; pt verbalized understanding; pt to DC to self today with 1 day bus pass given    Has the Recommended Plan of Care/Level of Observation been reviewed with the patient's assigned nurse? yes    Does patient/parent or guardian express agreement with the above plan? yes      Referral appointment(s) scheduled? no    Alert team only:   I have discussed findings and recommendations with Dr. Terrell who is in agreement with these recommendations. Legal hold DC'd    Referral information sent to the following outpatient community providers : [facility]    Referral information sent to the following inpatient community providers :NA    If applicable : Referred  to  Alert Team for legal hold follow up at (time): ABIMAEL Barrientos R.N.  2/24/2022            '

## 2022-02-24 NOTE — ED PROVIDER NOTES
ED Provider Note    CHIEF COMPLAINT  Chief Complaint   Patient presents with   • Legal 2000       HPI  Sandra Sigala is a 31 y.o. female who presents with delusions of spiders on her arm, cancer in her bones and being pregnant.  This is apparently recurrent themes for this patient, seen here 9 days ago subsequently transferred to inpatient psychiatry [facility] mental health services.  Patient found today wandering outside without pants on.  She denies chest pain, no abdominal pain.  No fever.  History of methamphetamine use according to the chart, she denies methamphetamine or alcohol use today.  She presents with normal vital signs.  Patient's history is well-known to our behavioral health evaluator today.    REVIEW OF SYSTEMS  Neurologic: Denies headache  Psychiatric: Delusional thoughts  GI: No abdominal pain  Respiratory: No shortness of breath        PAST MEDICAL HISTORY  Past Medical History:   Diagnosis Date   • Psychiatric disorder        FAMILY HISTORY  History reviewed. No pertinent family history.    SOCIAL HISTORY  Social History     Socioeconomic History   • Marital status:    Tobacco Use   • Smoking status: Current Every Day Smoker   • Smokeless tobacco: Never Used   Vaping Use   • Vaping Use: Never used   Substance and Sexual Activity   • Alcohol use: Yes   • Drug use: Yes     Comment: meth and marijuana every day       SURGICAL HISTORY  History reviewed. No pertinent surgical history.    CURRENT MEDICATIONS  No current facility-administered medications on file prior to encounter.     Current Outpatient Medications on File Prior to Encounter   Medication Sig Dispense Refill   • divalproex (DEPAKOTE) 500 MG Tablet Delayed Response Take 500 mg by mouth 2 times a day.         ALLERGIES  Allergies   Allergen Reactions   • Hydromorphone Swelling     Muscle spasm in throat         PHYSICAL EXAM  VITAL SIGNS: /81   Pulse 92   Temp 36.7 °C (98 °F) (Temporal)   Resp 14   Ht 1.727 m  "(5' 8\")   Wt 99.8 kg (220 lb)   SpO2 100%   BMI 33.45 kg/m²   Constitutional:  Non-toxic appearance.  Well-nourished  HENT: No facial swelling or trauma  Eyes: Conjunctiva normal, No discharge.   Cardiovascular: Regular rhythm, no murmurs  Pulmonary: Normal breath sounds  Abdomen: Soft, No tenderness.    Skin: Warm, Dry, No erythema, No rash.   Neurologic: Speech clear.  Strength in extremities intact.  Sensation intact  Psychiatric: Rambling speech, delusional thought process.  Does not appear depressed.      Results for orders placed or performed during the hospital encounter of 02/24/22   POC BREATHALIZER   Result Value Ref Range    POC Breathalizer 0.00 0.00 - 0.01 Percent           COURSE & MEDICAL DECISION MAKING  Pertinent Labs & Imaging studies reviewed. (See chart for details)  Patient has been evaluated by behavioral health and found to be near baseline. She is recommended to give the patient her daily dose of Depakote, found to be 1000 mg daily after her psychiatry facility was contacted, [facility] adult mental health services. She was discharged on this dose 3 weeks ago. Patient was given Atarax at recommendation of behavioral health for some anxiety. Currently she appears calm. Patient apparently has fixed delusions of \"cancer in her bones\" and being pregnant with multiple gestations. Per behavioral health were also seen her at [facility] services last year, patient is chronically delusional. Patient does not require psychiatric hold at this time as per behavioral health evaluation. She denies to me suicidal or homicidal ideation. Patient is advised to return if worse or for any concerns.    FINAL IMPRESSION  1. Delusion (HCC)     2. Anxiety           Electronically signed by: Jamal Gorman M.D., 2/24/2022 12:10 PM    "

## 2022-02-24 NOTE — DISCHARGE INSTRUCTIONS
Go to [facility] adult mental health services tomorrow to refill your prescriptions. Return for any concerns

## 2022-02-24 NOTE — ED NOTES
Pt states understanding of dc instructions and f/u care. Pt given pants and socks. Has her own jacket. Pt also given a bus pass.

## 2022-02-24 NOTE — ED TRIAGE NOTES
"Pt bib ems on legal hold from shelter c/o disorientation wandering around outside without pants on in the street. Pt A&Ox 2 disoriented to time and states president of [employer] is [place]. Pt making odd statements like \"I take depakote for cancer which is in my bones, spiders are eating my bones and that's what cancer is\" Pt also claims she is pregnant with \"6 or 9 babies\"   "

## 2022-02-26 ENCOUNTER — HOSPITAL ENCOUNTER (EMERGENCY)
Facility: MEDICAL CENTER | Age: 32
End: 2022-02-27
Attending: EMERGENCY MEDICINE
Payer: COMMERCIAL

## 2022-02-26 DIAGNOSIS — F15.10 AMPHETAMINE USE DISORDER, MILD (HCC): ICD-10-CM

## 2022-02-26 DIAGNOSIS — F99 PSYCHIATRIC DISORDER: ICD-10-CM

## 2022-02-26 LAB — ETHANOL BLD-MCNC: <10.1 MG/DL (ref 0–10)

## 2022-02-26 PROCEDURE — 302970 POC BREATHALIZER: Performed by: EMERGENCY MEDICINE

## 2022-02-26 PROCEDURE — A9270 NON-COVERED ITEM OR SERVICE: HCPCS | Performed by: EMERGENCY MEDICINE

## 2022-02-26 PROCEDURE — 99285 EMERGENCY DEPT VISIT HI MDM: CPT

## 2022-02-26 PROCEDURE — 700102 HCHG RX REV CODE 250 W/ 637 OVERRIDE(OP): Performed by: EMERGENCY MEDICINE

## 2022-02-26 PROCEDURE — 82077 ASSAY SPEC XCP UR&BREATH IA: CPT

## 2022-02-26 RX ORDER — DIVALPROEX SODIUM 500 MG/1
500 TABLET, DELAYED RELEASE ORAL EVERY 12 HOURS
Status: DISCONTINUED | OUTPATIENT
Start: 2022-02-27 | End: 2022-02-27 | Stop reason: HOSPADM

## 2022-02-26 RX ORDER — DIVALPROEX SODIUM 500 MG/1
500 TABLET, DELAYED RELEASE ORAL ONCE
Status: COMPLETED | OUTPATIENT
Start: 2022-02-26 | End: 2022-02-26

## 2022-02-26 RX ADMIN — DIVALPROEX SODIUM 500 MG: 500 TABLET, DELAYED RELEASE ORAL at 18:42

## 2022-02-26 ASSESSMENT — FIBROSIS 4 INDEX: FIB4 SCORE: 0.57

## 2022-02-27 VITALS
TEMPERATURE: 97.1 F | DIASTOLIC BLOOD PRESSURE: 70 MMHG | WEIGHT: 220.02 LBS | BODY MASS INDEX: 33.45 KG/M2 | HEART RATE: 88 BPM | OXYGEN SATURATION: 96 % | RESPIRATION RATE: 19 BRPM | SYSTOLIC BLOOD PRESSURE: 122 MMHG

## 2022-02-27 LAB
AMPHET UR QL SCN: POSITIVE
BARBITURATES UR QL SCN: NEGATIVE
BENZODIAZ UR QL SCN: NEGATIVE
BZE UR QL SCN: NEGATIVE
CANNABINOIDS UR QL SCN: POSITIVE
METHADONE UR QL SCN: NEGATIVE
OPIATES UR QL SCN: NEGATIVE
OXYCODONE UR QL SCN: NEGATIVE
PCP UR QL SCN: NEGATIVE
PROPOXYPH UR QL SCN: NEGATIVE

## 2022-02-27 PROCEDURE — 700102 HCHG RX REV CODE 250 W/ 637 OVERRIDE(OP): Performed by: EMERGENCY MEDICINE

## 2022-02-27 PROCEDURE — 80307 DRUG TEST PRSMV CHEM ANLYZR: CPT

## 2022-02-27 PROCEDURE — A9270 NON-COVERED ITEM OR SERVICE: HCPCS | Performed by: EMERGENCY MEDICINE

## 2022-02-27 RX ORDER — LORAZEPAM 1 MG/1
1 TABLET ORAL ONCE
Status: COMPLETED | OUTPATIENT
Start: 2022-02-27 | End: 2022-02-27

## 2022-02-27 RX ADMIN — DIVALPROEX SODIUM 500 MG: 500 TABLET, DELAYED RELEASE ORAL at 07:30

## 2022-02-27 RX ADMIN — LORAZEPAM 1 MG: 1 TABLET ORAL at 01:55
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
## 2022-02-27 NOTE — ED NOTES
Pt discharged home as ordered by erp. Pt instructed to follow up with her doctors and return here as needed. Pt given cab voucher and a cab was called. Pt left ambulating independently with her belongings.

## 2022-02-27 NOTE — ED NOTES
"Pt. Shouting at nurse, \"Fuck you I want to leave.\" Pt instructed she cannot leave until she is evaluated. Pt. Sitting up in bed. No acute distress noted.   "
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
## 2022-02-27 NOTE — ED NOTES
Attempted to obtain POC breathalizer, pt unable to blow long enough to obtain result. Pt awake and alert, but confused to month and time.

## 2022-02-27 NOTE — ED NOTES
Multiple attempts with breathalyzer. Pt unable to blow @ this time. Pt admits to Vodka and wine tonight. ERP notified

## 2022-03-04 ENCOUNTER — HOSPITAL ENCOUNTER (EMERGENCY)
Facility: MEDICAL CENTER | Age: 32
End: 2022-03-04
Attending: STUDENT IN AN ORGANIZED HEALTH CARE EDUCATION/TRAINING PROGRAM
Payer: COMMERCIAL

## 2022-03-04 ENCOUNTER — HOSPITAL ENCOUNTER (EMERGENCY)
Facility: MEDICAL CENTER | Age: 32
End: 2022-03-04
Attending: EMERGENCY MEDICINE
Payer: COMMERCIAL

## 2022-03-04 ENCOUNTER — APPOINTMENT (OUTPATIENT)
Dept: RADIOLOGY | Facility: MEDICAL CENTER | Age: 32
End: 2022-03-04
Attending: EMERGENCY MEDICINE
Payer: COMMERCIAL

## 2022-03-04 VITALS
RESPIRATION RATE: 18 BRPM | BODY MASS INDEX: 33.35 KG/M2 | WEIGHT: 220.02 LBS | TEMPERATURE: 97 F | HEIGHT: 68 IN | OXYGEN SATURATION: 98 % | HEART RATE: 88 BPM | DIASTOLIC BLOOD PRESSURE: 82 MMHG | SYSTOLIC BLOOD PRESSURE: 130 MMHG

## 2022-03-04 VITALS
WEIGHT: 220 LBS | HEIGHT: 68 IN | TEMPERATURE: 98.1 F | HEART RATE: 91 BPM | RESPIRATION RATE: 16 BRPM | SYSTOLIC BLOOD PRESSURE: 140 MMHG | DIASTOLIC BLOOD PRESSURE: 60 MMHG | BODY MASS INDEX: 33.34 KG/M2 | OXYGEN SATURATION: 94 %

## 2022-03-04 DIAGNOSIS — F19.10 SUBSTANCE ABUSE (HCC): ICD-10-CM

## 2022-03-04 DIAGNOSIS — Z59.00 HOMELESSNESS: ICD-10-CM

## 2022-03-04 DIAGNOSIS — R41.82 ALTERED MENTAL STATUS, UNSPECIFIED ALTERED MENTAL STATUS TYPE: ICD-10-CM

## 2022-03-04 LAB
ALBUMIN SERPL BCP-MCNC: 4.3 G/DL (ref 3.2–4.9)
ALBUMIN/GLOB SERPL: 0.9 G/DL
ALP SERPL-CCNC: 126 U/L (ref 30–99)
ALT SERPL-CCNC: 33 U/L (ref 2–50)
ANION GAP SERPL CALC-SCNC: 17 MMOL/L (ref 7–16)
APAP SERPL-MCNC: <5 UG/ML (ref 10–30)
AST SERPL-CCNC: 29 U/L (ref 12–45)
BASOPHILS # BLD AUTO: 0.5 % (ref 0–1.8)
BASOPHILS # BLD: 0.05 K/UL (ref 0–0.12)
BILIRUB SERPL-MCNC: 0.3 MG/DL (ref 0.1–1.5)
BUN SERPL-MCNC: 10 MG/DL (ref 8–22)
CALCIUM SERPL-MCNC: 9.9 MG/DL (ref 8.5–10.5)
CHLORIDE SERPL-SCNC: 99 MMOL/L (ref 96–112)
CO2 SERPL-SCNC: 21 MMOL/L (ref 20–33)
CREAT SERPL-MCNC: 0.87 MG/DL (ref 0.5–1.4)
EOSINOPHIL # BLD AUTO: 0.14 K/UL (ref 0–0.51)
EOSINOPHIL NFR BLD: 1.4 % (ref 0–6.9)
ERYTHROCYTE [DISTWIDTH] IN BLOOD BY AUTOMATED COUNT: 48.9 FL (ref 35.9–50)
ETHANOL BLD-MCNC: <10.1 MG/DL (ref 0–10)
GLOBULIN SER CALC-MCNC: 4.9 G/DL (ref 1.9–3.5)
GLUCOSE SERPL-MCNC: 104 MG/DL (ref 65–99)
HCG SERPL QL: NEGATIVE
HCT VFR BLD AUTO: 39.9 % (ref 37–47)
HGB BLD-MCNC: 12.8 G/DL (ref 12–16)
IMM GRANULOCYTES # BLD AUTO: 0.06 K/UL (ref 0–0.11)
IMM GRANULOCYTES NFR BLD AUTO: 0.6 % (ref 0–0.9)
LYMPHOCYTES # BLD AUTO: 1.81 K/UL (ref 1–4.8)
LYMPHOCYTES NFR BLD: 18.3 % (ref 22–41)
MCH RBC QN AUTO: 28.6 PG (ref 27–33)
MCHC RBC AUTO-ENTMCNC: 32.1 G/DL (ref 33.6–35)
MCV RBC AUTO: 89.1 FL (ref 81.4–97.8)
MONOCYTES # BLD AUTO: 0.69 K/UL (ref 0–0.85)
MONOCYTES NFR BLD AUTO: 7 % (ref 0–13.4)
NEUTROPHILS # BLD AUTO: 7.16 K/UL (ref 2–7.15)
NEUTROPHILS NFR BLD: 72.2 % (ref 44–72)
NRBC # BLD AUTO: 0 K/UL
NRBC BLD-RTO: 0 /100 WBC
PLATELET # BLD AUTO: 335 K/UL (ref 164–446)
PMV BLD AUTO: 10.5 FL (ref 9–12.9)
POTASSIUM SERPL-SCNC: 4 MMOL/L (ref 3.6–5.5)
PROT SERPL-MCNC: 9.2 G/DL (ref 6–8.2)
RBC # BLD AUTO: 4.48 M/UL (ref 4.2–5.4)
SALICYLATES SERPL-MCNC: <1 MG/DL (ref 15–25)
SODIUM SERPL-SCNC: 137 MMOL/L (ref 135–145)
WBC # BLD AUTO: 9.9 K/UL (ref 4.8–10.8)

## 2022-03-04 PROCEDURE — 700111 HCHG RX REV CODE 636 W/ 250 OVERRIDE (IP): Performed by: EMERGENCY MEDICINE

## 2022-03-04 PROCEDURE — 36415 COLL VENOUS BLD VENIPUNCTURE: CPT

## 2022-03-04 PROCEDURE — 82077 ASSAY SPEC XCP UR&BREATH IA: CPT

## 2022-03-04 PROCEDURE — 85025 COMPLETE CBC W/AUTO DIFF WBC: CPT

## 2022-03-04 PROCEDURE — 70450 CT HEAD/BRAIN W/O DYE: CPT

## 2022-03-04 PROCEDURE — 84703 CHORIONIC GONADOTROPIN ASSAY: CPT

## 2022-03-04 PROCEDURE — 80179 DRUG ASSAY SALICYLATE: CPT

## 2022-03-04 PROCEDURE — 99285 EMERGENCY DEPT VISIT HI MDM: CPT

## 2022-03-04 PROCEDURE — 94760 N-INVAS EAR/PLS OXIMETRY 1: CPT

## 2022-03-04 PROCEDURE — 80143 DRUG ASSAY ACETAMINOPHEN: CPT

## 2022-03-04 PROCEDURE — 96374 THER/PROPH/DIAG INJ IV PUSH: CPT

## 2022-03-04 PROCEDURE — 99284 EMERGENCY DEPT VISIT MOD MDM: CPT

## 2022-03-04 PROCEDURE — 80053 COMPREHEN METABOLIC PANEL: CPT

## 2022-03-04 RX ORDER — LORAZEPAM 2 MG/ML
1 INJECTION INTRAMUSCULAR ONCE
Status: COMPLETED | OUTPATIENT
Start: 2022-03-04 | End: 2022-03-04

## 2022-03-04 RX ADMIN — LORAZEPAM 1 MG: 2 INJECTION INTRAMUSCULAR; INTRAVENOUS at 04:52

## 2022-03-04 SDOH — ECONOMIC STABILITY - HOUSING INSECURITY: HOMELESSNESS UNSPECIFIED: Z59.00

## 2022-03-04 ASSESSMENT — FIBROSIS 4 INDEX
FIB4 SCORE: 0.57
FIB4 SCORE: 0.47

## 2022-03-04 NOTE — ED NOTES
"Pt given water for PO challenge as well as food. Pt drank some of the water and stated \"leave me alone i'm trying to sleep\"  "
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
## 2022-03-04 NOTE — ED NOTES
Pt provided discharge instructions. Pt verbalized understanding. Pt leaving ER in stable condition, pt ambulatory with steady gait.
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
## 2022-03-04 NOTE — DISCHARGE INSTRUCTIONS
Follow-up with primary care for reevaluation, medication management.    Do not use methamphetamine or other illicit substances.    Return the emergency department for altered mental status, seizure, hallucination, suicidal homicidal ideation, fever, vomiting or other new concerns.

## 2022-03-05 NOTE — ED PROVIDER NOTES
"CHIEF COMPLAINT  Chief Complaint   Patient presents with    Psych Eval     Pt BIBA after being called by PD. Per legal hold paperwork filed by PD \"Sandra was found in the rain and cold with only a t-shirt, underwear, and socks knocking on random doors asking for a blanket. Sandra also dropped her only 3 dollars in the wind and di not want the money.\" Pt was discharged from this facility this morning at 11am for same cc and same situation.       HPI  Sandra Sigala is a 31 y.o. female who presents patient was brought in on a legal 2000 by the police as she was found knocking on doors asking for a blanket and was in the rain.  Patient does have a history of underlying psychiatric illness although denies any current suicidal homicidal ideations auditory or visual hallucinations.  She is answering questions appropriately does not seem to be responding to internal stimuli.  She has no specific somatic complaints she only asks for a blanket and something to eat when she is in the room.       REVIEW OF SYSTEMS  See HPI for further details. All other systems are negative.     PAST MEDICAL HISTORY   has a past medical history of Psychiatric disorder.    SOCIAL HISTORY  Social History     Tobacco Use    Smoking status: Current Every Day Smoker    Smokeless tobacco: Never Used   Vaping Use    Vaping Use: Never used   Substance and Sexual Activity    Alcohol use: Yes    Drug use: Yes     Comment: meth and marijuana every day    Sexual activity: Not on file       SURGICAL HISTORY  patient denies any surgical history    CURRENT MEDICATIONS  Home Medications    **Home medications have not yet been reviewed for this encounter**         ALLERGIES  Allergies   Allergen Reactions    Hydromorphone Swelling     Muscle spasm in throat         FAMILY HISTORY  No pertinent family history    PHYSICAL EXAM  VITAL SIGNS: /66   Pulse (!) 109   Temp 35.8 °C (96.5 °F) (Temporal)   Resp 18   Ht 1.727 m (5' 8\")   Wt 99.8 kg (220 lb 0.3 " oz)   SpO2 92%   BMI 33.45 kg/m²  @[id number]::@   Pulse ox interpretation: I interpret this pulse ox as normal.  Constitutional: Alert in no apparent distress.  Poor hygiene  HENT: No signs of trauma, Bilateral external ears normal, Nose normal.   Eyes: Pupils are equal and reactive, Conjunctiva normal, Non-icteric.   Neck: Normal range of motion, No tenderness, Supple, No stridor.   Lymphatic: No lymphadenopathy noted.   Cardiovascular: Regular rate and rhythm, no murmurs.   Thorax & Lungs: Normal breath sounds, No respiratory distress, No wheezing, No chest tenderness.   Abdomen: Bowel sounds normal, Soft, No tenderness, No masses, No pulsatile masses. No peritoneal signs.  Skin: Warm, Dry, No erythema, No rash.   Back: No bony tenderness, No CVA tenderness.   Extremities: Intact distal pulses, No edema, No tenderness, No cyanosis.  Musculoskeletal: Good range of motion in all major joints. No tenderness to palpation or major deformities noted.   Neurologic: Alert , Normal motor function, Normal sensory function, No focal deficits noted.   Psychiatric: No SI or HI        COURSE & MEDICAL DECISION MAKING    Patient presented on a legal 2000 by police, for evaluation as she was knocking on doors asking for blanket stating that she was cold.  Upon my assessment patient is ANO x4 answering questions appropriately.  She does have an underlying psychiatric history though does not appear to be in acute decompensated psychiatric illness.  She does answer questions appropriately does not seem to be responding to external stimuli.  Denies any suicidal or homicidal ideations.  Given that she does not seem to be a danger to herself and others are not in acutely decompensated psychiatric illness I do not believe she meets criteria for legal 2000 and this was discontinued patient was amendable to going to the homeless shelter and it was arrangements were made for her to go to the homeless shelter she was discharged in a  stable condition.      The patient will not drink alcohol nor drive with prescribed medications. The patient will return for worsening symptoms and is stable at the time of discharge. The patient verbalizes understanding and will comply.    FINAL IMPRESSION  1.  Homelessness  2.  History of substance abuse  3.  History of psychiatric illness         Electronically signed by: Bennett Oliver, 3/4/2022 7:41 PM

## 2022-03-05 NOTE — DISCHARGE PLANNING
"Alert Team:    Pt assessed by ERP who states pt does not meet legal hold criteria at this time; legal hold discontinued by ERP. Pt presenting as baseline; known to alert team; chronic mental illness and chronically delusional, paranoid despite antipsychotic med management. Psych diagnoses hx includes Psychosis nos, Bipolar d/o, Methamphetamine abuse disorder. Pt was residing at [facility] last month; EMR from 2/24/22 states [facility] said pt is \"too acute\" for their services. Hx of aggression noted in chart.     Alert team asked by ERP to see if pt can go to shelter. Pt states she is willing to go to shelter and states, \"I'm just tired.\" This writer contacted [facility] and spoke with Afsaneh who states pt is welcome to go to their facility. Taxi voucher obtained from [place] for transportation to [facility]. Clothes obtained for pt to change into; pt yells, \"I'm in the hospital giving birth. Leave me alone.\" RN contacted security to see if plus size clothes can be obtained from [employer] and help escort pt out of the ED.   "

## 2022-03-05 NOTE — DISCHARGE INSTRUCTIONS
Go to the homeless shelter if you develop any thoughts of hurting yourself or others return to the emergency department for evaluation.

## 2022-03-05 NOTE — ED TRIAGE NOTES
"Chief Complaint   Patient presents with   • Psych Eval     Pt BIBA after being called by PD. Per legal hold paperwork filed by PD \"Sandra was found in the rain and cold with only a t-shirt, underwear, and socks knocking on random doors asking for a blanket. Sandra also dropped her only 3 dollars in the wind and di not want the money.\" Pt was discharged from this facility this morning at 11am for same cc and same situation.     /66   Pulse (!) 109   Temp 35.8 °C (96.5 °F) (Temporal)   Resp 18   Ht 1.727 m (5' 8\")   Wt 99.8 kg (220 lb 0.3 oz)   SpO2 92%   BMI 33.45 kg/m²     "

## 2023-01-31 ENCOUNTER — HOSPITAL ENCOUNTER (EMERGENCY)
Facility: MEDICAL CENTER | Age: 33
End: 2023-01-31
Attending: EMERGENCY MEDICINE
Payer: COMMERCIAL

## 2023-01-31 VITALS
RESPIRATION RATE: 16 BRPM | WEIGHT: 220 LBS | HEART RATE: 110 BPM | BODY MASS INDEX: 33.34 KG/M2 | OXYGEN SATURATION: 96 % | TEMPERATURE: 98.2 F | HEIGHT: 68 IN | SYSTOLIC BLOOD PRESSURE: 107 MMHG | DIASTOLIC BLOOD PRESSURE: 64 MMHG

## 2023-01-31 DIAGNOSIS — F29 PSYCHOSIS, UNSPECIFIED PSYCHOSIS TYPE (HCC): ICD-10-CM

## 2023-01-31 LAB — POC BREATHALIZER: 0 PERCENT (ref 0–0.01)

## 2023-01-31 PROCEDURE — 99284 EMERGENCY DEPT VISIT MOD MDM: CPT

## 2023-01-31 PROCEDURE — 302970 POC BREATHALIZER

## 2023-01-31 PROCEDURE — 700102 HCHG RX REV CODE 250 W/ 637 OVERRIDE(OP): Performed by: EMERGENCY MEDICINE

## 2023-01-31 PROCEDURE — A9270 NON-COVERED ITEM OR SERVICE: HCPCS | Performed by: EMERGENCY MEDICINE

## 2023-01-31 PROCEDURE — 302970 POC BREATHALIZER: Performed by: EMERGENCY MEDICINE

## 2023-01-31 RX ORDER — RISPERIDONE 2 MG/1
2 TABLET ORAL 2 TIMES DAILY
Status: DISCONTINUED | OUTPATIENT
Start: 2023-01-31 | End: 2023-01-31 | Stop reason: HOSPADM

## 2023-01-31 RX ADMIN — RISPERIDONE 2 MG: 2 TABLET ORAL at 19:12

## 2023-01-31 RX ADMIN — RISPERIDONE 2 MG: 2 TABLET ORAL at 11:38

## 2023-01-31 ASSESSMENT — LIFESTYLE VARIABLES
ON A TYPICAL DAY WHEN YOU DRINK ALCOHOL HOW MANY DRINKS DO YOU HAVE: 0
DO YOU DRINK ALCOHOL: NO
HAVE PEOPLE ANNOYED YOU BY CRITICIZING YOUR DRINKING: NO
TOTAL SCORE: 0
TOTAL SCORE: 0
HOW MANY TIMES IN THE PAST YEAR HAVE YOU HAD 5 OR MORE DRINKS IN A DAY: 0
EVER HAD A DRINK FIRST THING IN THE MORNING TO STEADY YOUR NERVES TO GET RID OF A HANGOVER: NO
EVER FELT BAD OR GUILTY ABOUT YOUR DRINKING: NO
AVERAGE NUMBER OF DAYS PER WEEK YOU HAVE A DRINK CONTAINING ALCOHOL: 0
CONSUMPTION TOTAL: NEGATIVE
HAVE YOU EVER FELT YOU SHOULD CUT DOWN ON YOUR DRINKING: NO
TOTAL SCORE: 0

## 2023-01-31 ASSESSMENT — FIBROSIS 4 INDEX: FIB4 SCORE: 0.48

## 2023-01-31 NOTE — ED PROVIDER NOTES
"ER Provider Note    Scribed for Rigo Paula M.D. by Judith Inman. 1/31/2023  10:28 AM    Primary Care Provider: Pcp Pt States None    CHIEF COMPLAINT  Chief Complaint   Patient presents with    GLF     Pt BIBA after being found on the ground in [facility]. Pt states that she did hit her head, +LOC, -thinners. No trauma noted on head. Pt had an abrasion to BL knees.     Psych Eval     Pt having flight of ideas. Pt stating, \" I overdosed on chemo pills last night because my  told me to. I have organ, bone and brain caner.\" Pt states, \"I am a  and I think everyone is angels and one day I'll be a big doctor in a hospital.\" Pt denies SI/HI at this time and is calm and cooperative. Pt reports that she has been in [facility] recently but got out because, \" I wanted to get my brother out of assisted because he likes to kill babies.\"      LIMITATION TO HISTORY   Select: Poor historian and active psychosis    HPI/ROS  OUTSIDE HISTORIAN(S):  EMS and Nurse    EXTERNAL RECORDS REVIEWED  Outpatient Notes patient has not been in the emergency department since March 2022    Sandra Sigala is a 32 y.o. female who presents to the ED via EMS for a psychiatric evaluation. Per nursing note, the patient was found on the ground in [facility] and was brought here by EMS. The patient take Depakote, but states that she would like to try Seroquel. The patient states that she has a pressure in her chest and has multiple types of cancers. She says that her  found out that she was on chemotherapy yesterday and stole her medications. 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 She denies any suicidal or homicidal ideations.     PAST MEDICAL HISTORY  Past Medical History:   Diagnosis Date    Psychiatric disorder        SURGICAL HISTORY  History reviewed. No pertinent surgical history.    FAMILY HISTORY  History reviewed. No pertinent family history.    SOCIAL HISTORY   reports that she has " "been smoking. She has never used smokeless tobacco. She reports current alcohol use. She reports current drug use.    CURRENT MEDICATIONS  Previous Medications    DIVALPROEX (DEPAKOTE) 500 MG TABLET DELAYED RESPONSE    Take 500 mg by mouth 2 times a day.       ALLERGIES  Hydromorphone    PHYSICAL EXAM  /61   Pulse 91   Temp 36.8 °C (98.3 °F) (Oral)   Resp 14   Ht 1.727 m (5' 8\")   Wt 99.8 kg (220 lb)   SpO2 98%   BMI 33.45 kg/m²     Constitutional: Well developed, Well nourished, No acute distress, Non-toxic appearance.   HENT: Normocephalic, Atraumatic, Bilateral external ears normal, Oropharynx is clear mucous membranes are moist. No oral exudates or nasal discharge.   Eyes: Pupils are equal round and reactive, EOMI, Conjunctiva normal, No discharge.   Neck: Normal range of motion, No tenderness, Supple, No stridor. No meningismus.  Lymphatic: No lymphadenopathy noted.   Cardiovascular: Regular rate and rhythm without murmur rub or gallop.  Thorax & Lungs: Clear breath sounds bilaterally without wheezes, rhonchi or rales. There is no chest wall tenderness.   Abdomen: Soft non-tender non-distended. There is no rebound or guarding. No organomegaly is appreciated. Bowel sounds are normal.  Skin: Normal without rash.   Back: No CVA or spinal tenderness.   Extremities: Intact distal pulses, No edema, No tenderness, No cyanosis, No clubbing. Capillary refill is less than 2 seconds.  Musculoskeletal: Good range of motion in all major joints. No tenderness to palpation or major deformities noted.   Neurologic: Alert & oriented x 3, Normal motor function, Normal sensory function, No focal deficits noted. Reflexes are normal.  Psychiatric: Flight of ideas, Tangential thoughts, Derailed thoughts.     DIAGNOSTIC STUDIES & PROCEDURES    Labs:   Labs Reviewed   URINE DRUG SCREEN   DIAGNOSTIC ALCOHOL      All labs reviewed by me.     COURSE & MEDICAL DECISION MAKING    ED Observation Status? Yes; I am placing the " patient in to an observation status due to a diagnostic uncertainty as well as therapeutic intensity. Patient placed in observation status at 10:37 AM, 1/31/2023.     Observation plan is as follows: Initial psychiatric medications for stabilization, lets see how she responds however I am placing her on a legal hold pending [facility] evaluation and likely recommendation for placement      INITIAL ASSESSMENT AND PLAN  Care Narrative:       10:37 AM - Patient seen and evaluated at bedside. Patient will be treated with Risperdal 2 mg tablet for her symptoms. Ordered labs to evaluate.     Patient's talk screen is pending.  Historically she had a talk screen back last year that was positive for amphetamines and cannabinoid      ADDITIONAL PROBLEM LIST AND DISPOSITION  Homelessness               DISPOSITION AND DISCUSSIONS    Discussion of management with other [facility] or appropriate source(s): Behavioral Health discussed patient's need for legal hold and psychiatric stabilization      Escalation of care considered, and ultimately not performed: blood analysis.    Barriers to care at this time, including but not limited to: Patient is homeless.       Patient signed out approximately 2 PM to Dr. Degroot awaiting placement and has received initial medications for psychiatric stabilization    FINAL IMPRESSION   1. Psychosis, unspecified psychosis type (HCC)        Judith MEDRANO (Sudarshan), am scribing for, and in the presence of, Rigo Paula M.D..    Electronically signed by: Judith Evans), 1/31/2023    Rigo MEDRANO M.D. personally performed the services described in this documentation, as scribed by Judith Inman in my presence, and it is both accurate and complete.    The note accurately reflects work and decisions made by me.  Rigo Paula M.D.  1/31/2023  2:17 PM

## 2023-01-31 NOTE — ED TRIAGE NOTES
"Chief Complaint   Patient presents with    GLF     Pt BIBA after being found on the ground in [place]. Pt states that she did hit her head, +LOC, -thinners. No trauma noted on head. Pt had an abrasion to BL knees.     Psych Eval     Pt having flight of ideas. Pt stating, \" I overdosed on chemo pills last night because my  told me to. I have organ, bone and brain caner.\" Pt states, \"I am a  and I think everyone is angels and one day I'll be a big doctor in a hospital.\" Pt denies SI/HI at this time and is calm and cooperative. Pt reports that she has [facility] but got out because, \" I wanted to get my brother out of long-term because he likes to kill babies.\"      Pt BIBA for above complaint. Pt A+Ox3, unable to state when her birthday is. Pt reports meth use occasionally. VSS.       /81   Pulse (!) 107   Resp 18   Ht 1.727 m (5' 8\")   Wt 99.8 kg (220 lb)   SpO2 97%     "

## 2023-01-31 NOTE — DISCHARGE PLANNING
"Pt is delusional, with flight of ideas and pressured speech. EMR documents multiple presentations for similar. She had 6 ED admissions in February and March of 2022, but has not been seen here since. She states she was in [facility], but cannot state what the charges were. She states she has a diagnosis of \"bipolar 5\" and she receives a ECHEVERRIA from [facility], last was administered yesterday. Denies SI/HI, no acute MH crisis at this time, as pt appears to be at baseline.   "

## 2023-01-31 NOTE — ED NOTES
Unable to address med rec. Patient is unable to verify medications that she is using at home or which pharmacy she gets her medications from. Pharmacy on file was [facility] in [place] ([phone]), however pharmacist at [facility] performed a central search and was unable to locate a profile for patient at any [facility]. Unable to reach patient's mother Val or spouse Walt at phone numbers listed in emergency contacts ([phone] and [phone] respectively).

## 2023-02-01 NOTE — DISCHARGE SUMMARY
"  ED Observation Discharge Summary    Patient:Sandra Sigala  Patient : 1990  Patient MRN: [id number]  Patient PCP: Pcp Pt States None    Admit Date: 2023  Discharge Date and Time: 23 6:04 PM  Discharge Diagnosis: Chronic psychosis  Discharge Attending: Brian Degroot M.D.  Discharge Service: ED Observation    ED Course  Sandra is a 32 y.o. female who was evaluated at [facility] for psychosis.  Patient has a longstanding history of psychosis.  Patient without any associated thoughts of self-harm at this point.  She received Risperdal and is feeling considerably improved.  Patient has multiple resources at [facility].  She has been evaluated by our psychiatric nursing team is contracted patient to safety.    Discharge Exam:  /64   Pulse (!) 110   Temp 36.8 °C (98.2 °F) (Temporal)   Resp 16   Ht 1.727 m (5' 8\")   Wt 99.8 kg (220 lb)   SpO2 96%   BMI 33.45 kg/m² .    Constitutional: Awake and alert. Nontoxic  HENT:  Grossly normal  Eyes: Grossly normal  Neck: Normal range of motion  Cardiovascular: Normal heart rate   Thorax & Lungs: No respiratory distress  Abdomen: Nontender  Skin:  No pathologic rash.   Extremities: Well perfused  Psychiatric: Affect normal    Labs  Results for orders placed or performed during the hospital encounter of 23   POC BREATHALIZER   Result Value Ref Range    POC Breathalizer 0.00 0.00 - 0.01 Percent       Radiology  No orders to display       Medications:   New Prescriptions    No medications on file       My final assessment includes evaluation of patient's current mental status, she does not have any SI or HI and does not appear to be a harm to herself or others.  Upon Reevaluation, the patient's condition has: Improved; and will be discharged.    Patient discharged from ED Observation status at 6:07 PM   (Time) 2023 (Date).     Total time spent on this ED Observation discharge encounter is < 30 Minutes    Electronically signed by: rBian Degroot, " M.D., 1/31/2023 6:04 PM

## 2023-02-01 NOTE — ED NOTES
Discharge teaching and paperwork provided regarding hallucinations and psychosis and all questions/concerns answered. VSS,  assessment stable. Given information regarding home care and reasons to follow up with ED or primary MD. Patient discharged to the care of self and ambulated out of the ED.

## 2023-02-03 ENCOUNTER — HOSPITAL ENCOUNTER (EMERGENCY)
Facility: MEDICAL CENTER | Age: 33
End: 2023-02-03
Attending: STUDENT IN AN ORGANIZED HEALTH CARE EDUCATION/TRAINING PROGRAM
Payer: COMMERCIAL

## 2023-02-03 VITALS
RESPIRATION RATE: 18 BRPM | TEMPERATURE: 97.5 F | HEART RATE: 97 BPM | DIASTOLIC BLOOD PRESSURE: 76 MMHG | OXYGEN SATURATION: 100 % | BODY MASS INDEX: 40.48 KG/M2 | SYSTOLIC BLOOD PRESSURE: 131 MMHG | HEIGHT: 62 IN | WEIGHT: 220 LBS

## 2023-02-03 DIAGNOSIS — F15.10 METHAMPHETAMINE USE (HCC): ICD-10-CM

## 2023-02-03 LAB — POC BREATHALIZER: 0 PERCENT (ref 0–0.01)

## 2023-02-03 PROCEDURE — 99285 EMERGENCY DEPT VISIT HI MDM: CPT

## 2023-02-03 PROCEDURE — 302970 POC BREATHALIZER: Performed by: STUDENT IN AN ORGANIZED HEALTH CARE EDUCATION/TRAINING PROGRAM

## 2023-02-03 ASSESSMENT — FIBROSIS 4 INDEX
FIB4 SCORE: 0.48
FIB4 SCORE: 0.48

## 2023-02-03 NOTE — ED PROVIDER NOTES
ED Provider Note    CHIEF COMPLAINT  Chief Complaint   Patient presents with    Psych Eval    Generalized Body Aches     BIB by EMS; PD called them; patient was naked walking in [place]; admitted to have meth and alcohol today unsure how much did she drink; states that she is disable and she has a bone cancer that is why she is disable and can't walk from a far. Aox3;  patient known to have psych issues but not on meds       EXTERNAL RECORDS REVIEWED  Inpatient Notes most recent ED visit for psychosis and methamphetamine intoxication    HPI/ROS  LIMITATION TO HISTORY   Select: Intoxication  OUTSIDE HISTORIAN(S):  EMS was found walking naked outside with [place] admits to alcohol and meth use.    Sandra Sigala is a 32 y.o. female who presents for evaluation after being found walking naked outside patient states she went to the bus stop and they would not let her on the bus so she smoked methamphetamine and drink alcohol she was then found walking naked outside.  Patient denies any suicidal or homicidal ideations denies any auditory visual hallucinations.     PAST MEDICAL HISTORY   has a past medical history of Psychiatric disorder.    SURGICAL HISTORY  patient denies any surgical history    FAMILY HISTORY  History reviewed. No pertinent family history.    SOCIAL HISTORY  Social History     Tobacco Use    Smoking status: Every Day    Smokeless tobacco: Never   Vaping Use    Vaping Use: Never used   Substance and Sexual Activity    Alcohol use: Yes    Drug use: Yes     Comment: meth and marijuana every day    Sexual activity: Not on file       CURRENT MEDICATIONS  Home Medications       Reviewed by Wil Bowers (Pharmacy Tech) on 02/03/23 at 6085  Med List Status: Unable to Obtain     Medication Last Dose Status        Patient Chris Taking any Medications                           ALLERGIES  Allergies   Allergen Reactions    Dilaudid [Hydromorphone] Swelling     Muscle spasm in throat         PHYSICAL  "EXAM  VITAL SIGNS: /76   Pulse 97   Temp 36.4 °C (97.5 °F) (Temporal)   Resp 18   Ht 1.575 m (5' 2\")   Wt 99.8 kg (220 lb)   SpO2 100%   BMI 40.24 kg/m²      Pulse ox interpretation: I interpret this pulse ox as normal.  VITALS - vital signs documented prior to this note have been reviewed and noted,  GENERAL -awake alert disheveled   HEENT - normocephalic, atraumatic, pupils equal, sclera anicteric, mucus  membranes moist  NECK - supple, no meningismus, full active range of motion, trachea midline  CARDIOVASCULAR - regular rate/rhythm, no murmurs/gallops/rubs  PULMONARY - no respiratory distress, speaking in full sentences, clear to  auscultation bilaterally, no wheezing/ronchi/rales, no accessory muscle use  GASTROINTESTINAL - soft, non-tender, non-distended, no rebound, guarding,  or peritonitis  GENITOURINARY - Deferred  NEUROLOGIC - Awake alert, normal mental status, speech fluid, cognition  normal, moves all extremities  MUSCULOSKELETAL - no obvious asymmetry or deformities present  EXTREMITIES - warm, well-perfused, no cyanosis or significant edema  DERMATOLOGIC - warm, dry, no rashes, no jaundice  PSYCHIATRIC - pressed speech, denies suicidal homicidal ideations auditory visual hallucinations at times.  Thoughts are tangential, states that she has bone cancer    DIAGNOSTIC STUDIES / PROCEDURES    COURSE & MEDICAL DECISION MAKING    ED Observation Status? Yes; I am placing the patient in to an observation status due to a diagnostic uncertainty as well as therapeutic intensity. Patient placed in observation status at 3:01 AM, 2/3/2023.     Observation plan is as follows: Observation for clinical sobriety and reevaluation for possible legal hold    Upon Reevaluation, the patient's condition has: Improved; and will be discharged.    Patient discharged from ED Observation status at 0633 (Time) 02/3 (Date).     INITIAL ASSESSMENT, COURSE AND PLAN  Care Narrative:   Patient presented for evaluation of " methamphetamine intoxication.  Patient admitted to smoking meth prior to coming to the emergency department initially upon my assessment she did have her speech and flight of ideas she was placed in the ED observation pending clinical sobriety and reassessment.  Does have an underlying reported psychiatric disorder though denied any auditory or visual hallucinations suicidal or homicidal ideations.  Patient was observed overnight and upon reassessment, she had significantly improved was answering questions appropriately she did not express interest in rehab for methamphetamine or alcohol abuse.  And again denied any suicidal or homicidal ideations.  She does not appear to be in an acutely decompensated psychiatric illness I believe her presentation this evening was likely secondary to her acute methamphetamine intoxication.  This does not meet criteria for legal 2000.  Prior to discharge she was A&O x4 answering questions appropriately and ambulating with steady gait.      HTN/IDDM FOLLOW UP:  The patient is referred to a primary physician for blood pressure management, diabetic screening, and for all other preventive health concerns      ADDITIONAL PROBLEM LIST  1.  Methamphetamine abuse observation  DISPOSITION AND DISCUSSIONS  I have discussed management of the patient with the following physicians and JW's:  none    Discussion of management with other [facility] or appropriate source(s): Behavioral Health        Escalation of care considered, and ultimately not performed:acute inpatient care management, however at this time, the patient is most appropriate for outpatient management after observation patient    Barriers to care at this time, including but not limited to: Patient is homeless.     Decision tools and prescription drugs considered including, but not limited to:  none .    FINAL DIAGNOSIS  1. Methamphetamine use (HCC)           Electronically signed by: Bennett Oliver D.O., 2/3/2023 2:26 AM

## 2023-02-03 NOTE — ED NOTES
Belongings given to patient; discharge paper given to patient; verbalized understanding; left ambulatory

## 2023-02-03 NOTE — ED TRIAGE NOTES
"Chief Complaint   Patient presents with    Psych Eval    Generalized Body Aches     BIB by EMS; PD called them; patient was naked walking at [place]; admitted to have meth and alcohol today unsure how much did she drink; states that she is disable and she has a bone cancer that is why she is disable and can't walk from a far. Aox3;       BP (!) 144/103   Pulse 98   Resp 18   Ht 1.575 m (5' 2\")   Wt 99.8 kg (220 lb)   SpO2 100%   BMI 40.24 kg/m²     "

## 2023-02-06 ENCOUNTER — HOSPITAL ENCOUNTER (EMERGENCY)
Facility: MEDICAL CENTER | Age: 33
End: 2023-02-06
Attending: STUDENT IN AN ORGANIZED HEALTH CARE EDUCATION/TRAINING PROGRAM
Payer: COMMERCIAL

## 2023-02-06 VITALS
OXYGEN SATURATION: 91 % | TEMPERATURE: 97.9 F | HEART RATE: 96 BPM | RESPIRATION RATE: 16 BRPM | BODY MASS INDEX: 37.56 KG/M2 | WEIGHT: 220 LBS | DIASTOLIC BLOOD PRESSURE: 83 MMHG | SYSTOLIC BLOOD PRESSURE: 143 MMHG | HEIGHT: 64 IN

## 2023-02-06 DIAGNOSIS — F15.10 METHAMPHETAMINE ABUSE (HCC): ICD-10-CM

## 2023-02-06 PROCEDURE — 99284 EMERGENCY DEPT VISIT MOD MDM: CPT

## 2023-02-06 PROCEDURE — 700102 HCHG RX REV CODE 250 W/ 637 OVERRIDE(OP): Performed by: STUDENT IN AN ORGANIZED HEALTH CARE EDUCATION/TRAINING PROGRAM

## 2023-02-06 PROCEDURE — A9270 NON-COVERED ITEM OR SERVICE: HCPCS | Performed by: STUDENT IN AN ORGANIZED HEALTH CARE EDUCATION/TRAINING PROGRAM

## 2023-02-06 RX ORDER — LORAZEPAM 1 MG/1
1 TABLET ORAL ONCE
Status: COMPLETED | OUTPATIENT
Start: 2023-02-06 | End: 2023-02-06

## 2023-02-06 RX ADMIN — LORAZEPAM 1 MG: 1 TABLET ORAL at 03:04

## 2023-02-06 ASSESSMENT — FIBROSIS 4 INDEX: FIB4 SCORE: 0.48

## 2023-02-06 NOTE — ED TRIAGE NOTES
Pt presents to the ED by EMS for Psych Eval. Pt was picked up while hanging out at the bus stop. Upon arrival pt states that she is actively pregnant and having 7 babies and that she was here last week where she had one. Pt denies SI/HI. Pt denies Drugs or alcohol use. Pt states that she has bone cancer. Pt breathing is easy and non labored. Pt denies cp or sob. Pt skin is w/d

## 2023-02-06 NOTE — ED NOTES
Patient provided with discharge instructions. Education complete, all questions answered.   Vitals stable. All personal belongings accounted for.   Patient ambulated out of the ER.

## 2023-02-06 NOTE — ED NOTES
Pt refusing to complete breathalyzer, ERP notified. Pt is agitated about the quality of food available in the ER and requesting that [facility] becomes a better hospital that has benton open at night and davis packets for the dry turkey sandwiches.

## 2023-02-06 NOTE — ED PROVIDER NOTES
"ED Provider Note    CHIEF COMPLAINT  Chief Complaint   Patient presents with    Psych Eval     Pt found [place] bus stop. Pt states that she is actively pregnant with 7 babies. She states that she was here last week and had 1 of them. Pt denies SI/HI           HPI/ROS  LIMITATION TO HISTORY   Select: Behavior Desiree Greer is a 32 y.o. female who presents with bizarre behavior, delusions, rapid speech, difficult to assess.  Patient is a poor historian.  Patient reports that she is pregnant and having 7 babies, she reports that she has bone cancer, in the past she has reported that she uses methamphetamine to treat her bone cancer per her doctor's recommendation.  Patient denies any pain at this time.  Denies head trauma.  Patient denies suicidal homicidal ideation, auditory or visual hallucinations.    PAST MEDICAL HISTORY   has a past medical history of Psychiatric disorder.    SURGICAL HISTORY  patient denies any surgical history    FAMILY HISTORY  No family history on file.    SOCIAL HISTORY  Social History     Tobacco Use    Smoking status: Every Day    Smokeless tobacco: Never   Vaping Use    Vaping Use: Never used   Substance and Sexual Activity    Alcohol use: Yes    Drug use: Yes     Comment: meth and marijuana every day    Sexual activity: Not on file       CURRENT MEDICATIONS  Home Medications    **Home medications have not yet been reviewed for this encounter**         ALLERGIES  Allergies   Allergen Reactions    Dilaudid [Hydromorphone] Swelling     Muscle spasm in throat         PHYSICAL EXAM  VITAL SIGNS: BP (!) 143/83   Pulse 96   Temp 36.6 °C (97.9 °F) (Temporal)   Resp 16   Ht 1.626 m (5' 4\")   Wt 99.8 kg (220 lb)   SpO2 91%   BMI 37.76 kg/m²    Physical Exam  Vitals and nursing note reviewed.   Constitutional:       Comments: Patient is lying in bed supine, pleasant, conversant, speaking in complete sentences   HENT:      Head: Normocephalic and atraumatic.   Eyes:      Extraocular " Movements: Extraocular movements intact.      Conjunctiva/sclera: Conjunctivae normal.      Pupils: Pupils are equal, round, and reactive to light.   Cardiovascular:      Pulses: Normal pulses.      Comments:   Pulmonary:      Effort: Pulmonary effort is normal. No respiratory distress.   Musculoskeletal:         General: No swelling. Normal range of motion.      Cervical back: Normal range of motion. No rigidity.   Skin:     General: Skin is warm and dry.      Capillary Refill: Capillary refill takes less than 2 seconds.   Neurological:      Mental Status: She is alert.   Psychiatric:         Attention and Perception: She is inattentive.         Mood and Affect: Affect is labile.         Speech: Speech is tangential.         Behavior: Behavior is uncooperative and agitated.         Thought Content: Thought content is delusional. Thought content does not include homicidal or suicidal ideation. Thought content does not include homicidal or suicidal plan.         DIAGNOSTIC STUDIES / PROCEDURES      COURSE & MEDICAL DECISION MAKING    ED Observation Status? Yes; I am placing the patient in to an observation status due to a diagnostic uncertainty as well as therapeutic intensity. Patient placed in observation status at 3:24 AM, 2/6/2023.     Observation plan is as follows: Disposition pending sobriety and improve mental status    Upon Reevaluation, the patient's condition has: Improved; and will be discharged.    Patient discharged from ED Observation status at 0651 (Time) 2/6/23 (Date).     INITIAL ASSESSMENT, COURSE AND PLAN  Care Narrative: Patient without suicidal homicidal ideation, no auditory visual hallucinations.  Patient does have delusions and has bizarre behavior which is consistent with previous diagnoses of methamphetamine intoxication.  Patient asking for food and water and has been given and tolerating oral intake.  Patient given small dose of Ativan for suspected sympathomimetic intoxication.  No  evidence of head trauma, intracranial abnormality inconsistent with patient presentation at this time.  Urine drug screen pending.  hCG pending.  Disposition pending work-up.    Electronically signed by: Reggie Ferreira M.D., 2/6/2023 3:25 AM    Patient tolerating oral intake at this time, oriented x3, linear thought process, no longer acutely intoxicated.  Patient's presentations in the past have been very similar and she has had positive amphetamines on her tox screen.  Tonight I do believe this is what she was suffering from acutely.  Patient counseled on the risks and dangers of drug use.      Repeat physical exam benign.  I doubt any serious emergency process at this time.  Patient and/or family, friends given strict return precautions and care instructions. They have demonstrated understanding of discharge instructions through teach back mechanism. Advised PCP follow-up in 1-2 days.  Patient/family/friend expresses understanding and agrees to plan.    This dictation has been created using voice recognition software. I am continuously working with the software to minimize the number of voice recognition errors and I have made every attempt to manually correct the errors within my dictation. However errors  related to this voice recognition software may still exist and should be interpreted within the appropriate context.     Electronically signed by: Reggie Ferreira M.D., 2/6/2023 6:52 AM        FINAL DIAGNOSIS  1. Methamphetamine abuse ([facility]CC)           Electronically signed by: Reggie Ferreira M.D., 2/6/2023 2:57 AM

## 2023-04-13 NOTE — ED NOTES
Pt sleeping in bed. Waiting for lifeskills.    Continue Regimen: triamcinolone acetonide 0.1 % topical ointment \\nQuantity: 454.0 g  Days Supply: 30\\nSig: Apply to affected areas bid for two weeks then prn for flare ups Detail Level: Zone

## 2024-06-13 NOTE — ED NOTES
ERP at bedside   Get blood work done one day next week when you're fasting.  Schedule EMG of the legs with Dr. To at this clinic.  EMG procedure code (CPT code) is 09729.  Follow-up with me after EMG to discuss results and next steps.

## 2025-04-03 NOTE — ED TRIAGE NOTES
"Chief Complaint   Patient presents with   • Psych Eval     patient bib EMS from [facility] parking lot after yelling at customers and throwing things around. Patient hx of bipolar, unsure when she took her psych meds last       30 yo female to triage for above complaint. Patient states \"I want to get a boob job, do you do that here?\". Patient has flight of ideas, unable to sit still in triage. Patient states she takes depakote for bipolar, but is unsure when her last dose was.    Patient placed back in lobby and educated on triage process. Asked to inform RN of any changes.    BP (!) 161/92   Pulse (!) 125   Temp 36.1 °C (96.9 °F) (Temporal)   Resp (!) 22   Ht 1.727 m (5' 8\")   Wt 111 kg (245 lb)   SpO2 97%   BMI 37.25 kg/m²     " MRI obtained: No suspicious hepatic lesions.  There is a simple right hepatic lobe cyst.  Mild diffuse hepatic steatosis.  LFTs stable  Outpatient follow up advised